# Patient Record
Sex: MALE | Race: BLACK OR AFRICAN AMERICAN | Employment: OTHER | URBAN - METROPOLITAN AREA
[De-identification: names, ages, dates, MRNs, and addresses within clinical notes are randomized per-mention and may not be internally consistent; named-entity substitution may affect disease eponyms.]

---

## 2017-06-06 ENCOUNTER — HOSPITAL ENCOUNTER (OUTPATIENT)
Dept: GENERAL RADIOLOGY | Age: 77
Discharge: HOME | End: 2017-06-06
Attending: INTERNAL MEDICINE
Payer: MEDICARE

## 2017-06-06 DIAGNOSIS — M25.511 SHOULDER PAIN, RIGHT: ICD-10-CM

## 2017-06-06 DIAGNOSIS — M25.512 SHOULDER PAIN, LEFT: ICD-10-CM

## 2017-06-06 PROCEDURE — 73030 X-RAY EXAM OF SHOULDER: CPT

## 2017-07-18 ENCOUNTER — OFFICE VISIT (OUTPATIENT)
Dept: FAMILY MEDICINE CLINIC | Age: 77
End: 2017-07-18

## 2017-07-18 VITALS
RESPIRATION RATE: 16 BRPM | HEIGHT: 69 IN | SYSTOLIC BLOOD PRESSURE: 140 MMHG | HEART RATE: 66 BPM | OXYGEN SATURATION: 99 % | WEIGHT: 187 LBS | TEMPERATURE: 95.6 F | DIASTOLIC BLOOD PRESSURE: 75 MMHG | BODY MASS INDEX: 27.7 KG/M2

## 2017-07-18 DIAGNOSIS — I10 ESSENTIAL HYPERTENSION: ICD-10-CM

## 2017-07-18 DIAGNOSIS — Z86.73 HISTORY OF CVA (CEREBROVASCULAR ACCIDENT): ICD-10-CM

## 2017-07-18 DIAGNOSIS — N18.5 CKD (CHRONIC KIDNEY DISEASE), STAGE 5: Primary | ICD-10-CM

## 2017-07-18 DIAGNOSIS — F32.A DEPRESSION, UNSPECIFIED DEPRESSION TYPE: ICD-10-CM

## 2017-07-18 PROBLEM — N18.9 CKD (CHRONIC KIDNEY DISEASE): Status: ACTIVE | Noted: 2017-07-18

## 2017-07-18 RX ORDER — TAMSULOSIN HYDROCHLORIDE 0.4 MG/1
0.4 CAPSULE ORAL DAILY
COMMUNITY
End: 2018-04-03

## 2017-07-18 RX ORDER — SERTRALINE HYDROCHLORIDE 100 MG/1
100 TABLET, FILM COATED ORAL DAILY
Qty: 30 TAB | Refills: 5 | Status: SHIPPED | OUTPATIENT
Start: 2017-07-18 | End: 2017-08-01

## 2017-07-18 RX ORDER — FUROSEMIDE 20 MG/1
20 TABLET ORAL 2 TIMES DAILY
COMMUNITY
End: 2017-08-01

## 2017-07-18 RX ORDER — AMLODIPINE BESYLATE 10 MG/1
TABLET ORAL DAILY
COMMUNITY
End: 2017-08-01

## 2017-07-18 RX ORDER — SERTRALINE HYDROCHLORIDE 50 MG/1
TABLET, FILM COATED ORAL DAILY
COMMUNITY
End: 2017-07-18 | Stop reason: SDUPTHER

## 2017-07-18 RX ORDER — SEVELAMER CARBONATE 800 MG/1
TABLET, FILM COATED ORAL 3 TIMES DAILY
COMMUNITY
End: 2017-08-01

## 2017-07-18 RX ORDER — ASPIRIN AND DIPYRIDAMOLE 25; 200 MG/1; MG/1
1 CAPSULE, EXTENDED RELEASE ORAL 2 TIMES DAILY
COMMUNITY
End: 2019-03-27 | Stop reason: SDUPTHER

## 2017-07-18 NOTE — MR AVS SNAPSHOT
Visit Information Date & Time Provider Department Dept. Phone Encounter #  
 7/18/2017  2:45 PM Ronda Lowry, [address] [facility] [phone] [id number] Follow-up Instructions Return in about 3 months (around 10/18/2017) for 30 minute slot. Upcoming Health Maintenance Date Due DTaP/Tdap/Td series (1 - Tdap) 6/15/1959 ZOSTER VACCINE AGE 60> 6/15/1998 GLAUCOMA SCREENING Q2Y 6/15/2003 Pneumococcal 65+ Low/Medium Risk (1 of 2 - PCV13) 6/15/2003 MEDICARE YEARLY EXAM 6/15/2003 INFLUENZA AGE 9 TO ADULT 8/1/2017 Allergies as of 7/18/2017  Review Complete On: 7/18/2017 By: Ronda Lowry MD  
 No Known Allergies Current Immunizations  Never Reviewed No immunizations on file. Not reviewed this visit You Were Diagnosed With   
  
 Codes Comments CKD (chronic kidney disease), stage 5 (HCC)    -  Primary ICD-10-CM: N18.5 ICD-9-CM: 585.5 Depression, unspecified depression type     ICD-10-CM: F32.9 ICD-9-CM: 043 Essential hypertension     ICD-10-CM: I10 
ICD-9-CM: 401.9 History of CVA (cerebrovascular accident)     ICD-10-CM: Z86.73 
ICD-9-CM: V12.54 confusion. occured in 2005 Vitals BP Pulse Temp Resp Height(growth percentile) Weight(growth percentile) 140/75 66 95.6 °F (35.3 °C) (Oral) 16 5' 8.5\" (1.74 m) 187 lb (84.8 kg) SpO2 BMI Smoking Status 99% 28.02 kg/m2 Never Smoker Vitals History BMI and BSA Data Body Mass Index Body Surface Area 28.02 kg/m 2 2.02 m 2 Preferred Pharmacy Pharmacy Name Phone [id number] [phone] Your Updated Medication List  
  
   
This list is accurate as of: 7/18/17  3:34 PM.  Always use your most recent med list. amLODIPine 10 mg tablet Commonly known as:  Sigurdsandra Saldana Take  by mouth daily. aspirin-dipyridamole  mg per SR capsule Commonly known as:  AGGRENOX Take 1 Cap by mouth two (2) times a day. FLOMAX 0.4 mg capsule Generic drug:  tamsulosin Take 0.4 mg by mouth daily. LASIX 20 mg tablet Generic drug:  furosemide Take 20 mg by mouth two (2) times a day. RENVELA 800 mg Tab tab Generic drug:  sevelamer carbonate Take  by mouth three (3) times daily. sertraline 100 mg tablet Commonly known as:  ZOLOFT Take 1 Tab by mouth daily. Prescriptions Sent to Pharmacy Refills  
 sertraline (ZOLOFT) 100 mg tablet 5 Sig: Take 1 Tab by mouth daily. Class: Normal  
 Pharmacy: [address] [place] [address], [address] [place] [address] #: [phone] Route: Oral  
  
We Performed the Following REFERRAL TO NEPHROLOGY [GSB24 Custom] Comments:  
 Please evaluate patient for CKD/HD- pt would like a second opinion (currently seeing Dr. Jonathon Muñoz) Follow-up Instructions Return in about 3 months (around 10/18/2017) for 30 minute slot. Referral Information Referral ID Referred By Referred To  
  
 [id number] TERRELL Ackerman Not Available Visits Status Start Date End Date 1 New Request 7/18/17 7/18/18 If your referral has a status of pending review or denied, additional information will be sent to support the outcome of this decision. Patient Instructions Sertraline (By mouth) Sertraline (SER-tra-lebron) Treats depression, obsessive-compulsive disorder (OCD), posttraumatic stress disorder (PTSD), premenstrual dysphoric disorder (PMDD), social anxiety disorder, and panic disorder. This medicine is an SSRI. Brand Name(s): Zoloft There may be other brand names for this medicine. When This Medicine Should Not Be Used: This medicine is not right for everyone. Do not use it if you had an allergic reaction to sertraline. How to Use This Medicine:  
Liquid, Tablet · Take your medicine as directed.  Your dose may need to be changed several times to find what works best for you. You may need to take it for a few weeks or months before you feel better. · Oral liquid: Use the dropper provided to remove the medicine and mix it with 1/2 cup (4 ounces) of water, ginger ale, lemon-lime soda, lemonade, or orange juice. Drink the mixture right away. It is normal for it to look a bit hazy. · This medicine should come with a Medication Guide. Ask your pharmacist for a copy if you do not have one. · Missed dose: Take a dose as soon as you remember. If it is almost time for your next dose, wait until then and take a regular dose. Do not take extra medicine to make up for a missed dose. · Store the medicine in a closed container at room temperature, away from heat, moisture, and direct light. Drugs and Foods to Avoid: Ask your doctor or pharmacist before using any other medicine, including over-the-counter medicines, vitamins, and herbal products. · Do not use this medicine together with pimozide. Do not use this medicine and an MAO inhibitor (MAOI) within 14 days of each other. Do not use the oral liquid form of sertraline if you are also using disulfiram. 
· Some medicines can affect how sertraline works. Tell your doctor if you are using the following:  
¨ Buspirone, cimetidine, cisapride, diazepam, digitoxin, fentanyl, flecainide, lithium, phenytoin, propafenone, Nancy's wort, tramadol, tryptophan supplements, or valproate ¨ A blood thinner (such as warfarin), a diuretic (water pill), an NSAID pain or arthritis medicine (such as aspirin, diclofenac, ibuprofen), a tricyclic antidepressant, a triptan medicine for migraine headaches · Do not drink alcohol while you are using this medicine. Warnings While Using This Medicine: · Tell your doctor if you are pregnant or breastfeeding, or if you have liver disease, bleeding problems, glaucoma, heart disease, or a seizure disorder.  
· For some children, teenagers, and young adults, this medicine may increase mental or emotional problems. This may lead to thoughts of suicide and violence. Talk with your doctor right away if you have any thoughts or behavior changes that concern you. Tell your doctor if you or anyone in your family has a history of bipolar disorder or suicide attempts. · This medicine may cause the following problems:  
¨ Serotonin syndrome (when taken with certain medicines) ¨ Low sodium levels (more common in elderly patients and those who take diuretics or become dehydrated) · Tell your doctor if you are sensitive to latex, because the oral liquid comes with a latex rubber dropper. · This medicine may make you dizzy or drowsy. Do not drive or do anything that could be dangerous until you know how this medicine affects you. · Do not stop using this medicine suddenly. Your doctor will need to slowly decrease your dose before you stop it completely. · Your doctor will check your progress and the effects of this medicine at regular visits. Keep all appointments. · Keep all medicine out of the reach of children. Never share your medicine with anyone. Possible Side Effects While Using This Medicine:  
Call your doctor right away if you notice any of these side effects: · Allergic reaction: Itching or hives, swelling in your face or hands, swelling or tingling in your mouth or throat, chest tightness, trouble breathing · Anxiety, restlessness, fast heartbeat, fever, sweating, muscle spasms, twitching, nausea, vomiting, diarrhea, seeing or hearing things that are not there · Blistering, peeling, or red skin rash · Confusion, weakness, and muscle twitching · Eye pain, vision changes, seeing halos around lights · Feeling more excited or energetic than usual 
· Thoughts of hurting yourself or others, unusual behavior · Unusual bleeding or bruising If you notice these less serious side effects, talk with your doctor: · Dry mouth · Loss of appetite, weight loss · Mild diarrhea, constipation, nausea, vomiting · Sexual problems · Sleepiness, or trouble sleeping If you notice other side effects that you think are caused by this medicine, tell your doctor. Call your doctor for medical advice about side effects. You may report side effects to FDA at 0-534-AGH-4506 © 2017 [address] Information is for End User's use only and may not be sold, redistributed or otherwise used for commercial purposes. The above information is an  only. It is not intended as medical advice for individual conditions or treatments. Talk to your doctor, nurse or pharmacist before following any medical regimen to see if it is safe and effective for you. Introducing [facility]! [facility] introduces [employer] Compare patient portal. Now you can access parts of your medical record, email your doctor's office, and request medication refills online. 1. In your internet browser, go to [email or web address] 2. Click on the First Time User? Click Here link in the Sign In box. You will see the New Member Sign Up page. 3. Enter your [employer] Compare Access Code exactly as it appears below. You will not need to use this code after youve completed the sign-up process. If you do not sign up before the expiration date, you must request a new code. · [employer] Compare Access Code: [email or web address] Expires: 10/16/2017  3:33 PM 
 
4. Enter the last four digits of your Social Security Number (xxxx) and Date of Birth (mm/dd/yyyy) as indicated and click Submit. You will be taken to the next sign-up page. 5. Create a WizIQt ID. This will be your Cruise Compare login ID and cannot be changed, so think of one that is secure and easy to remember. 6. Create a [employer] Compare password. You can change your password at any time. 7. Enter your Password Reset Question and Answer. This can be used at a later time if you forget your password. 8. Enter your e-mail address. You will receive e-mail notification when new information is available in [address] 9. Click Sign Up. You can now view and download portions of your medical record. 10. Click the Download Summary menu link to download a portable copy of your medical information. If you have questions, please visit the Frequently Asked Questions section of the [employer] website. Remember, [employer] is NOT to be used for urgent needs. For medical emergencies, dial 911. Now available from your iPhone and Android! Please provide this summary of care documentation to your next provider. Your primary care clinician is listed as MORGAN [place]. If you have any questions after today's visit, please call [phone].

## 2017-07-18 NOTE — PROGRESS NOTES
Sonya Robbins is a 76 y.o.  male and presents with Establish Care       Subjective:    CKD- sees Dr. Ede Mcgovern- on HD. Pt is unsure why he needs 4 hrs and 15 minutes of HD- he was told it was required by insurance- he would like a second opinion. htn- takes norvasc  H/o CVA in 2005- on aggrenox. No further sx. Depression- on zoloft- control is so-so pt reports, gets anxious on dialysis. No SI/HI. H/o prostate cancer- s/p resection- sees urology yearly. Assessment/Plan:    CKD- will refer pt to renal for a second opinion at his request but I explained that Dr. Ede Mcgovern has always been a very trustworthy physician in my interactions with him. HTN- bp adequately controlled today. No changes. H/o CVA- stable. Continue aggrenox. Depression- moderate control- will increase to 100mg - no dosage change for CKD. H/o prostate cancer- continue to f/u with urology- unsure of Dr name. rtc in 3 months- will try to get some old records. ROS:  Constitutional: No recent weight change. No weakness/fatigue. No f/c. Skin: No rashes, change in nails/hair, itching   HENT: No HA, dizziness. No hearing loss/tinnitus. No nasal congestion/discharge. Eyes: No change in vision, double/blurred vision or eye pain/redness. Cardiovascular: No CP/palpitations. No HARRIS/orthopnea/PND. Respiratory: No cough/sputum, dyspnea, wheezing. Gastointestinal: No dysphagia, reflux. No n/v. No constipation/diarrhea. No melena/rectal bleeding. Genitourinary: No dysuria, urinary hesitancy, nocturia, hematuria. No incontinence. Musculoskeletal: No joint pain/stiffness. No muscle pain/tenderness. Endo: No heat/cold intolerance, no polyuria/polydypsia. Heme: No h/o anemia. No easy bleeding/bruising. Allergy/Immunology: No seasonal rhinitis. Denies frequent colds, sinus/ear infections. Neurological: No seizures/numbness/weakness. No paresthesias. Psychiatric:  No depression, anxiety.      PMH:  Past Medical History: Diagnosis Date    Cancer [facility])     Chronic kidney disease     Depression     [facility]ialysis patient ([facility] [address] [facility]     Hypertension     Prostate cancer ([facility] [address]        There is no problem list on file for this patient. PSH:  Past Surgical History:   Procedure Laterality Date    HX COLONOSCOPY  2015    HX HEENT      Cataract surgery 2002        SH:  Social History   Substance Use Topics    Smoking status: Never Smoker    Smokeless tobacco: Never Used    Alcohol use 4.8 oz/week     3 Cans of beer, 5 Shots of liquor per week       FH:  History reviewed. No pertinent family history. Medications/Allergies:    Current Outpatient Prescriptions:     sevelamer carbonate (RENVELA) 800 mg tab tab, Take  by mouth three (3) times daily. , Disp: , Rfl:     tamsulosin (FLOMAX) 0.4 mg capsule, Take 0.4 mg by mouth daily. , Disp: , Rfl:     sertraline (ZOLOFT) 50 mg tablet, Take  by mouth daily. , Disp: , Rfl:     amLODIPine (NORVASC) 10 mg tablet, Take  by mouth daily. , Disp: , Rfl:     aspirin-dipyridamole (AGGRENOX)  mg per SR capsule, Take 1 Cap by mouth two (2) times a day., Disp: , Rfl:     furosemide (LASIX) 20 mg tablet, Take 20 mg by mouth two (2) times a day., Disp: , Rfl:     No Known Allergies    Objective:  Visit Vitals    /75    Pulse 66    Temp 95.6 °F (35.3 °C) (Oral)    Resp 16    Ht 5' 8.5\" (1.74 m)    Wt 187 lb (84.8 kg)    SpO2 99%    BMI 28.02 kg/m2    Body mass index is 28.02 kg/(m^2). Constitutional: Well developed, nourished, no distress, alert   HENT: Exterior ears and tympanic membranes normal bilaterally. Supple neck. No thyromegaly or lymphadenopathy. Oropharynx clear and moist mucous membranes. Eyes: Conjunctiva normal. PERRL. Cardiovascular: S1, S2.  RRR. No murmurs/rubs. No thrills palpated. No carotid bruits. Intact distal pulses. No edema. Pulmonary/Chest Wall: No abnormalities on inspection. Clear to auscultation bilaterally. No wheezing/rhonchi.   Normal effort. GI: Soft, nontender, nondistended. Normal active bowel sounds. No  masses on palpation. No hepatosplenomegaly. Musculoskeletal: Gait normal.  Joints without deformity/tenderness. Strength intact bilateral upper and lower ext. Normal ROM all extremities. Neurological: Appropriate. 2+DTR. No focal motor or sensory deficits. Speech normal.   Skin: No lesions/rashes on inspection. Psych: Appropriate affect, judgement and insight. Short-term memory intact. Health Maintenance:   Health Maintenance   Topic Date Due    DTaP/Tdap/Td series (1 - Tdap) 06/15/1959    ZOSTER VACCINE AGE 60>  06/15/1998    GLAUCOMA SCREENING Q2Y  06/15/2003    Pneumococcal 65+ Low/Medium Risk (1 of 2 - PCV13) 06/15/2003    MEDICARE YEARLY EXAM  06/15/2003    INFLUENZA AGE 9 TO ADULT  08/01/2017       Orders Placed This Encounter    REFERRAL TO NEPHROLOGY     Referral Priority:   Routine     Referral Type:   Consultation     Referral Reason:   Specialty Services Required    sevelamer carbonate (RENVELA) 800 mg tab tab     Sig: Take  by mouth three (3) times daily.  tamsulosin (FLOMAX) 0.4 mg capsule     Sig: Take 0.4 mg by mouth daily.  DISCONTD: sertraline (ZOLOFT) 50 mg tablet     Sig: Take  by mouth daily.  amLODIPine (NORVASC) 10 mg tablet     Sig: Take  by mouth daily.  aspirin-dipyridamole (AGGRENOX)  mg per SR capsule     Sig: Take 1 Cap by mouth two (2) times a day.  furosemide (LASIX) 20 mg tablet     Sig: Take 20 mg by mouth two (2) times a day.  sertraline (ZOLOFT) 100 mg tablet     Sig: Take 1 Tab by mouth daily.      Dispense:  30 Tab     Refill:  5

## 2017-07-18 NOTE — PATIENT INSTRUCTIONS
Sertraline (By mouth)   Sertraline (SER-tra-lebron)  Treats depression, obsessive-compulsive disorder (OCD), posttraumatic stress disorder (PTSD), premenstrual dysphoric disorder (PMDD), social anxiety disorder, and panic disorder. This medicine is an SSRI. Brand Name(s): Zoloft   There may be other brand names for this medicine. When This Medicine Should Not Be Used: This medicine is not right for everyone. Do not use it if you had an allergic reaction to sertraline. How to Use This Medicine:   Liquid, Tablet  · Take your medicine as directed. Your dose may need to be changed several times to find what works best for you. You may need to take it for a few weeks or months before you feel better. · Oral liquid: Use the dropper provided to remove the medicine and mix it with 1/2 cup (4 ounces) of water, ginger ale, lemon-lime soda, lemonade, or orange juice. Drink the mixture right away. It is normal for it to look a bit hazy. · This medicine should come with a Medication Guide. Ask your pharmacist for a copy if you do not have one. · Missed dose: Take a dose as soon as you remember. If it is almost time for your next dose, wait until then and take a regular dose. Do not take extra medicine to make up for a missed dose. · Store the medicine in a closed container at room temperature, away from heat, moisture, and direct light. Drugs and Foods to Avoid:   Ask your doctor or pharmacist before using any other medicine, including over-the-counter medicines, vitamins, and herbal products. · Do not use this medicine together with pimozide. Do not use this medicine and an MAO inhibitor (MAOI) within 14 days of each other. Do not use the oral liquid form of sertraline if you are also using disulfiram.  · Some medicines can affect how sertraline works.  Tell your doctor if you are using the following:   ¨ Buspirone, cimetidine, cisapride, diazepam, digitoxin, fentanyl, flecainide, lithium, phenytoin, propafenone, St Arun's wort, tramadol, tryptophan supplements, or valproate  ¨ A blood thinner (such as warfarin), a diuretic (water pill), an NSAID pain or arthritis medicine (such as aspirin, diclofenac, ibuprofen), a tricyclic antidepressant, a triptan medicine for migraine headaches  · Do not drink alcohol while you are using this medicine. Warnings While Using This Medicine:   · Tell your doctor if you are pregnant or breastfeeding, or if you have liver disease, bleeding problems, glaucoma, heart disease, or a seizure disorder. · For some children, teenagers, and young adults, this medicine may increase mental or emotional problems. This may lead to thoughts of suicide and violence. Talk with your doctor right away if you have any thoughts or behavior changes that concern you. Tell your doctor if you or anyone in your family has a history of bipolar disorder or suicide attempts. · This medicine may cause the following problems:   ¨ Serotonin syndrome (when taken with certain medicines)  ¨ Low sodium levels (more common in elderly patients and those who take diuretics or become dehydrated)  · Tell your doctor if you are sensitive to latex, because the oral liquid comes with a latex rubber dropper. · This medicine may make you dizzy or drowsy. Do not drive or do anything that could be dangerous until you know how this medicine affects you. · Do not stop using this medicine suddenly. Your doctor will need to slowly decrease your dose before you stop it completely. · Your doctor will check your progress and the effects of this medicine at regular visits. Keep all appointments. · Keep all medicine out of the reach of children. Never share your medicine with anyone.   Possible Side Effects While Using This Medicine:   Call your doctor right away if you notice any of these side effects:  · Allergic reaction: Itching or hives, swelling in your face or hands, swelling or tingling in your mouth or throat, chest tightness, trouble breathing  · Anxiety, restlessness, fast heartbeat, fever, sweating, muscle spasms, twitching, nausea, vomiting, diarrhea, seeing or hearing things that are not there  · Blistering, peeling, or red skin rash  · Confusion, weakness, and muscle twitching  · Eye pain, vision changes, seeing halos around lights  · Feeling more excited or energetic than usual  · Thoughts of hurting yourself or others, unusual behavior  · Unusual bleeding or bruising  If you notice these less serious side effects, talk with your doctor:   · Dry mouth  · Loss of appetite, weight loss  · Mild diarrhea, constipation, nausea, vomiting  · Sexual problems  · Sleepiness, or trouble sleeping  If you notice other side effects that you think are caused by this medicine, tell your doctor. Call your doctor for medical advice about side effects. You may report side effects to FDA at 9-769-FDA-3981  © 2017 [address] Information is for End User's use only and may not be sold, redistributed or otherwise used for commercial purposes. The above information is an  only. It is not intended as medical advice for individual conditions or treatments. Talk to your doctor, nurse or pharmacist before following any medical regimen to see if it is safe and effective for you.

## 2017-07-18 NOTE — LETTER
07/19/17 Svitlana Houston [address] [facility] Danae Dear Svitlana Houston, Welcome to [facility] and thank you for choosing me as your primary care physician. My staff is a skilled and caring team who is committed to providing patients with the best care possible within their new medical home. As a local physician I am excited about welcoming new patients to my practice as well as developing long lasting relationships with established patients and their friends and family members. I look forward to a continued relationship of care, compassion and trust with you. If you have family members or friends who may be searching for a new doctor I would be happy to see them. They may contact my staff and schedule an appointment convenient with their schedule by calling [phone]. My schedule is flexible and offers both early morning appointments beginning at 8:00am as well as same day visits. We understand the busy schedules of life and will work together to assure that their experience is pleasant and meets their medical needs. Thank you for giving me the opportunity to care for you. I look forward to seeing you again and welcome your friends and family as part of our medical home.    
 
 
Sincerely, 
 
 
Cassie Brush MD

## 2017-08-01 ENCOUNTER — HOSPITAL ENCOUNTER (EMERGENCY)
Age: 77
Discharge: HOME | End: 2017-08-01
Attending: EMERGENCY MEDICINE
Payer: MEDICARE

## 2017-08-01 VITALS
TEMPERATURE: 97.6 F | BODY MASS INDEX: 28.04 KG/M2 | RESPIRATION RATE: 14 BRPM | WEIGHT: 185 LBS | DIASTOLIC BLOOD PRESSURE: 85 MMHG | HEIGHT: 68 IN | SYSTOLIC BLOOD PRESSURE: 176 MMHG | HEART RATE: 66 BPM | OXYGEN SATURATION: 100 %

## 2017-08-01 DIAGNOSIS — N18.9 CHRONIC KIDNEY DISEASE, UNSPECIFIED STAGE: Primary | ICD-10-CM

## 2017-08-01 LAB
ANION GAP BLD CALC-SCNC: 8 MMOL/L (ref 3–18)
APPEARANCE UR: CLEAR
BACTERIA URNS QL MICRO: ABNORMAL /HPF
BILIRUB UR QL: NEGATIVE
BUN SERPL-MCNC: 37 MG/DL (ref 7–18)
BUN/CREAT SERPL: 6 (ref 12–20)
CALCIUM SERPL-MCNC: 9.1 MG/DL (ref 8.5–10.1)
CHLORIDE SERPL-SCNC: 104 MMOL/L (ref 100–108)
CO2 SERPL-SCNC: 28 MMOL/L (ref 21–32)
COLOR UR: YELLOW
CREAT SERPL-MCNC: 5.75 MG/DL (ref 0.6–1.3)
EPITH CASTS URNS QL MICRO: ABNORMAL /LPF (ref 0–5)
GLUCOSE SERPL-MCNC: 101 MG/DL (ref 74–99)
GLUCOSE UR STRIP.AUTO-MCNC: NEGATIVE MG/DL
HGB UR QL STRIP: ABNORMAL
HYALINE CASTS URNS QL MICRO: ABNORMAL /LPF (ref 0–2)
KETONES UR QL STRIP.AUTO: NEGATIVE MG/DL
LEUKOCYTE ESTERASE UR QL STRIP.AUTO: NEGATIVE
MUCOUS THREADS URNS QL MICRO: ABNORMAL /LPF
NITRITE UR QL STRIP.AUTO: NEGATIVE
PH UR STRIP: 7.5 [PH] (ref 5–8)
POTASSIUM SERPL-SCNC: 4.9 MMOL/L (ref 3.5–5.5)
PROT UR STRIP-MCNC: 300 MG/DL
RBC #/AREA URNS HPF: ABNORMAL /HPF (ref 0–5)
SODIUM SERPL-SCNC: 140 MMOL/L (ref 136–145)
SP GR UR REFRACTOMETRY: 1.02 (ref 1–1.03)
UROBILINOGEN UR QL STRIP.AUTO: 1 EU/DL (ref 0.2–1)
WBC URNS QL MICRO: ABNORMAL /HPF (ref 0–4)

## 2017-08-01 PROCEDURE — 99283 EMERGENCY DEPT VISIT LOW MDM: CPT

## 2017-08-01 PROCEDURE — 81001 URINALYSIS AUTO W/SCOPE: CPT | Performed by: PHYSICIAN ASSISTANT

## 2017-08-01 PROCEDURE — 80048 BASIC METABOLIC PNL TOTAL CA: CPT | Performed by: PHYSICIAN ASSISTANT

## 2017-08-01 NOTE — DISCHARGE INSTRUCTIONS
Chronic Kidney Disease: Care Instructions  Your Care Instructions  Chronic kidney disease happens when your kidneys don't work as well as they should. Your kidneys have a few important jobs. They remove waste from your blood. This waste leaves your body in your urine. They also balance your body's fluids and chemicals. When your kidneys don't work well, extra waste and fluid can build up. This can poison the body and sometimes cause death. The most common causes of this disease are diabetes and high blood pressure. In some cases, the disease develops in 2 to 3 months. But it usually develops over many years. If you take medicine and make healthy changes to your lifestyle, you may be able to prevent the disease from getting worse. But if your kidney damage gets worse, you may need dialysis or a kidney transplant. Dialysis uses a machine to filter waste from the blood. A transplant is surgery to give you a healthy kidney from another person. Follow-up care is a key part of your treatment and safety. Be sure to make and go to all appointments, and call your doctor if you are having problems. It's also a good idea to know your test results and keep a list of the medicines you take. How can you care for yourself at home? Treatments and appointments  · Be safe with medicines. Take your medicines exactly as prescribed. Call your doctor if you have any problems with your medicine. You also may take medicine to control your blood pressure or to treat diabetes. Many people who have diabetes take blood pressure medicine. · If you have diabetes, do your best to keep your blood sugar in your target range. You may do this by eating healthy food and exercising. You may also take medicines. · Go to your dialysis appointments if you have this treatment. · Do not take ibuprofen (Advil, Motrin), naproxen (Aleve), or similar medicines, unless your doctor tells you to. These may make the disease worse.   · Do not take any vitamins, over-the-counter medicines, or herbal products without talking to your doctor first.  · Do not smoke or use other tobacco products. Smoking can reduce blood flow to the kidneys. If you need help quitting, talk to your doctor about stop-smoking programs and medicines. These can increase your chances of quitting for good. · Do not drink alcohol or use illegal drugs. · Talk to your doctor about an exercise plan. Exercise helps lower your blood pressure. It also makes you feel better. · If you have an advance directive, let your doctor know. It may include a living will and a durable power of  for health care. If you don't have one, you may want to prepare one. It lets your doctor and loved ones know your health care wishes if you become unable to speak for yourself. Diet  · Talk to a registered dietitian. He or she can help you make a meal plan that is right for you. Most people with kidney disease need to limit salt (sodium), fluids, and protein. Some also have to limit potassium and phosphorus. · You may have to give up many foods you like. But try to focus on the fact that this will help you stay healthy for as long as possible. · If you have a hard time eating enough, talk to your doctor or dietitian about ways to add calories to your diet. · Your diet may change as your disease changes. See your doctor for regular testing. And work with a dietitian to change your diet as needed. When should you call for help? Call 911 anytime you think you may need emergency care. For example, call if:  · You passed out (lost consciousness). Call your doctor now or seek immediate medical care if:  · You have less urine than normal or no urine. · You have trouble urinating or can urinate only very small amounts. · You are confused or have trouble thinking clearly. · You feel weaker or more tired than usual.  · You are very thirsty, lightheaded, or dizzy. · You have nausea and vomiting.   · You have new swelling of your arms or feet, or your swelling is worse. · You have blood in your urine. · You have new or worse trouble breathing. Watch closely for changes in your health, and be sure to contact your doctor if:  · You have any problems with your medicine or other treatment. Where can you learn more? Go to http://lizy-maylin.info/. Enter N276 in the search box to learn more about \"Chronic Kidney Disease: Care Instructions. \"  Current as of: April 3, 2017  Content Version: 11.3  © 2599-8385 [employer]. Care instructions adapted under license by [employer] (which disclaims liability or warranty for this information). If you have questions about a medical condition or this instruction, always ask your healthcare professional. [address] any warranty or liability for your use of this information.

## 2017-08-01 NOTE — ED TRIAGE NOTES
Patient is a dialysis patient, wants his kidneys checked, nephologist is Rust,    Patient states he is incontinent of urine and is concerned that he urinates all of the time

## 2017-08-01 NOTE — ED PROVIDER NOTES
[facility] EMERGENCY DEPT      76 y.o. male with a PMH of prior Prostate CA and Stage V kidney disease presents to the ED asking to have his kidneys checked. States that he sees a nephrologist and has been on dialysis for the past 2 years. Says his is wondering if he still needs to be on dialysis because he is still making urine. Also is concerned that he is taking a \"water pill\" while being on dialysis. Denies any symptoms at this time. No current facility-administered medications for this encounter. Current Outpatient Prescriptions   Medication Sig    tamsulosin (FLOMAX) 0.4 mg capsule Take 0.4 mg by mouth daily.  aspirin-dipyridamole (AGGRENOX)  mg per SR capsule Take 1 Cap by mouth two (2) times a day.  sertraline (ZOLOFT) 50 mg tablet Take 50 mg by mouth daily.  sevelamer carbonate (RENVELA) 800 mg tab tab Take 800 mg by mouth three (3) times daily.  furosemide (LASIX) 20 mg tablet Take 20 mg by mouth daily.  amLODIPine (NORVASC) 10 mg tablet Take 10 mg by mouth daily. Indications: HYPERTENSION    atorvastatin (LIPITOR) 20 mg tablet Take 10 mg by mouth daily.        Past Medical History:   Diagnosis Date    Arthritis     Cancer ([facility]     Chronic kidney disease     dialysis    Chronic kidney disease     Community acquired pneumonia     Decreased exercise tolerance     Depression     Dialysis patient ([address]     Difficulty urinating     Dizziness     Hypercholesteremia     Hypercholesterolemia     Hypertension     Other malignant neoplasm without specification of site     Prostate CA ([facility] [address] [facility] 2007    s/p combined radiation therapy    Prostate cancer ([facility]     Renal failure     Stroke [facility])        Past Surgical History:   Procedure Laterality Date    HX COLONOSCOPY  2015    HX HEENT  cataract sx    HX HEENT      Cataract surgery 2002    HX OTHER SURGICAL  09/10/2014    DIALYSIS FISTULA OR GRAFT    HX OTHER SURGICAL  07/20/2015    VENOUS ACCESS CATHETER INSERTION    HX OTHER SURGICAL  11/03/2016    VENOUS ACCESS CATHETER INSERTION    HX OTHER SURGICAL  11/04/2016    ARTERIOVENOUS FISTULA REVISION    HX UROLOGICAL  2007    INSERTION RADIOACTIVE SEEDS       History reviewed. No pertinent family history. Social History     Social History    Marital status:      Spouse name: N/A    Number of children: N/A    Years of education: N/A     Occupational History    Not on file. Social History Main Topics    Smoking status: Never Smoker    Smokeless tobacco: Never Used    Alcohol use 4.8 oz/week     1 Cans of beer, 5 Shots of liquor per week      Comment: occasional    Drug use: No    Sexual activity: No      Comment: E.D. SP CAP/XRT     Other Topics Concern    Not on file     Social History Narrative    ** Merged History Encounter **            No Known Allergies    Patient's primary care provider (as noted in EPIC):  Yin Guthrie MD    Constitutional:  Denies malaise, fever, chills. GI/ABD:  Denies injury, pain, distention, nausea, vomiting, diarrhea. :  Denies injury, pain, dysuria or urgency. Back:  Denies injury or pain. Extremity/MS:  Denies injury or pain. Neuro:  Denies headache, LOC, dizziness, neurologic symptoms/deficits/paresthesias. Skin: Denies injury, rash, itching or skin changes. All other systems negative as reviewed. Visit Vitals    /85 (BP 1 Location: Right arm, BP Patient Position: At rest;Sitting)    Pulse 66    Temp 97.6 °F (36.4 °C)    Resp 14    Ht 5' 8\" (1.727 m)    Wt 83.9 kg (185 lb)    SpO2 100%    BMI 28.13 kg/m2       PHYSICAL EXAM:    CONSTITUTIONAL:  Alert, in no apparent distress;  well developed;  well nourished. HEAD:  Normocephalic, atraumatic. EYES:  EOMI. Non-icteric sclera. Normal conjunctiva. ENTM:  Mouth: mucous membranes moist.  NECK:  Supple  RESPIRATORY:  Chest clear, equal breath sounds, good air movement. Without wheezes, rhonchi or rales.     CARDIOVASCULAR: Regular rate and rhythm. No murmurs, rubs, or gallops. UPPER EXT:  Normal inspection. NEURO:  Moves all four extremities, and grossly normal motor exam.  SKIN:  No rashes;  Normal for age. PSYCH:  Alert and normal affect. ED COURSE:      Recent Results (from the past 12 hour(s))   URINALYSIS W/ RFLX MICROSCOPIC    Collection Time: 08/01/17 10:13 AM   Result Value Ref Range    Color YELLOW      Appearance CLEAR      Specific gravity 1.017 1.005 - 1.030      pH (UA) 7.5 5.0 - 8.0      Protein 300 (A) NEG mg/dL    Glucose NEGATIVE  NEG mg/dL    Ketone NEGATIVE  NEG mg/dL    Bilirubin NEGATIVE  NEG      Blood TRACE (A) NEG      Urobilinogen 1.0 0.2 - 1.0 EU/dL    Nitrites NEGATIVE  NEG      Leukocyte Esterase NEGATIVE  NEG     URINE MICROSCOPIC ONLY    Collection Time: 08/01/17 10:13 AM   Result Value Ref Range    WBC 0 to 3 0 - 4 /hpf    RBC 0 to 3 0 - 5 /hpf    Epithelial cells 1+ 0 - 5 /lpf    Bacteria 1+ (A) NEG /hpf    Mucus 1+ (A) NEG /lpf    Hyaline cast 0 to 3 0 - 2 /lpf   METABOLIC PANEL, BASIC    Collection Time: 08/01/17 10:20 AM   Result Value Ref Range    Sodium 140 136 - 145 mmol/L    Potassium 4.9 3.5 - 5.5 mmol/L    Chloride 104 100 - 108 mmol/L    CO2 28 21 - 32 mmol/L    Anion gap 8 3.0 - 18 mmol/L    Glucose 101 (H) 74 - 99 mg/dL    BUN 37 (H) 7.0 - 18 MG/DL    Creatinine 5.75 (H) 0.6 - 1.3 MG/DL    BUN/Creatinine ratio 6 (L) 12 - 20      GFR est AA 12 (L) >60 ml/min/1.73m2    GFR est non-AA 10 (L) >60 ml/min/1.73m2    Calcium 9.1 8.5 - 10.1 MG/DL       IMPRESSION AND MEDICAL DECISION MAKING:  Pt presents questioning whether he needs to be on dialysis, asking for a second opinion and questioning as to if he should be on Lasix (his fluid pill) while on dialysis. GFR is 12. Pt given a second nephrologist and informed that he may make an appointment to get another opinion. Pt looks well. Goes again for dialysis tomorrow.   Instructed to continue everything his prior nephrologist and PCP had him taking. Diagnosis:   1. ESRD (end stage renal disease) ([facility] [address]      Disposition: Discharge    Follow-up Information     Follow up With Details Comments Contact Info    Parviz Kruger M.D., [facility]. In 3 days  [address], [address] EMERGENCY DEPT  If symptoms worsen [address] [facility] [address]          Patient's Medications   Start Taking    No medications on file   Continue Taking    AMLODIPINE (NORVASC) 10 MG TABLET    Take 10 mg by mouth daily. Indications: HYPERTENSION    ASPIRIN-DIPYRIDAMOLE (AGGRENOX)  MG PER SR CAPSULE    Take 1 Cap by mouth two (2) times a day. ATORVASTATIN (LIPITOR) 20 MG TABLET    Take 10 mg by mouth daily. FUROSEMIDE (LASIX) 20 MG TABLET    Take 20 mg by mouth daily. SERTRALINE (ZOLOFT) 50 MG TABLET    Take 50 mg by mouth daily. SEVELAMER CARBONATE (RENVELA) 800 MG TAB TAB    Take 800 mg by mouth three (3) times daily. TAMSULOSIN (FLOMAX) 0.4 MG CAPSULE    Take 0.4 mg by mouth daily. These Medications have changed    No medications on file   Stop Taking    AMLODIPINE (NORVASC) 10 MG TABLET    Take  by mouth daily. DIPYRIDAMOLE-ASPIRIN (AGGRENOX) 200-25 MG PER SR CAPSULE    Take 1 Cap by mouth two (2) times a day. FUROSEMIDE (LASIX) 20 MG TABLET    Take 20 mg by mouth two (2) times a day. SERTRALINE (ZOLOFT) 100 MG TABLET    Take 1 Tab by mouth daily. SEVELAMER CARBONATE (RENVELA) 800 MG TAB TAB    Take  by mouth three (3) times daily. TAMSULOSIN (FLOMAX) 0.4 MG CAPSULE    Take 1 Cap by mouth daily.      SAMEER Holliday

## 2017-08-02 ENCOUNTER — PATIENT OUTREACH (OUTPATIENT)
Dept: FAMILY MEDICINE CLINIC | Age: 77
End: 2017-08-02

## 2017-08-02 NOTE — PROGRESS NOTES
Transitional Care: ED Visit        Patient listed on discharge (Daily Census) report on 8/2/2017. Patient discharged from [address] [place] [address] Emergency Department on  8/1/17 , diagnosed with  Chronic Kidney Disease. HPI: Reference by Matt ESTRELLA 8/1/17 1113 :76 y.o. male with a PMH of prior Prostate CA and Stage V kidney disease presents to the ED asking to have his kidneys checked. States that he sees a nephrologist and has been on dialysis for the past 2 years. Says his is wondering if he still needs to be on dialysis because he is still making urine. Also is concerned that he is taking a \"water pill\" while being on dialysis. Denies any symptoms at this time. Called patient on 8/2/2017,left voice mail for patient to return my call. Marli Barbour

## 2017-08-02 NOTE — PROGRESS NOTES
Transitional Care Follow up           Patient returned my call. Rested after Dialysis today and is feeling good. Mrs. Agustina Correa called  office to schedule appointment for second opinion, someone called them back and referred him to the ED for his second opinion. Patient's questions were answered, he still would like to know why he has to stay on Dialisys for 4hrs 15min. Explained purpose of Dalysis and goal to remove excess waist and fluid. Time is determined by the amount of waste and excess fluid. Patient seam satisfied and did not have further questions. Will fu in a few weeks.

## 2018-09-11 ENCOUNTER — HOSPITAL ENCOUNTER (OUTPATIENT)
Dept: LAB | Age: 78
Discharge: HOME | End: 2018-09-11
Payer: MEDICARE

## 2018-09-11 ENCOUNTER — OFFICE VISIT (OUTPATIENT)
Dept: FAMILY MEDICINE CLINIC | Age: 78
End: 2018-09-11

## 2018-09-11 VITALS
HEART RATE: 81 BPM | BODY MASS INDEX: 27.58 KG/M2 | HEIGHT: 68 IN | TEMPERATURE: 96.6 F | DIASTOLIC BLOOD PRESSURE: 60 MMHG | WEIGHT: 182 LBS | RESPIRATION RATE: 16 BRPM | SYSTOLIC BLOOD PRESSURE: 126 MMHG | OXYGEN SATURATION: 98 %

## 2018-09-11 DIAGNOSIS — E78.00 HYPERCHOLESTEREMIA: ICD-10-CM

## 2018-09-11 DIAGNOSIS — F33.41 RECURRENT MAJOR DEPRESSIVE DISORDER, IN PARTIAL REMISSION (HCC): ICD-10-CM

## 2018-09-11 DIAGNOSIS — I10 ESSENTIAL HYPERTENSION: ICD-10-CM

## 2018-09-11 DIAGNOSIS — C61 PROSTATE CA (HCC): ICD-10-CM

## 2018-09-11 DIAGNOSIS — Z99.2 CKD (CHRONIC KIDNEY DISEASE) REQUIRING CHRONIC DIALYSIS (HCC): Primary | ICD-10-CM

## 2018-09-11 DIAGNOSIS — N18.6 CKD (CHRONIC KIDNEY DISEASE) REQUIRING CHRONIC DIALYSIS (HCC): Primary | ICD-10-CM

## 2018-09-11 DIAGNOSIS — N18.6 CKD (CHRONIC KIDNEY DISEASE) REQUIRING CHRONIC DIALYSIS (HCC): ICD-10-CM

## 2018-09-11 DIAGNOSIS — Z99.2 CKD (CHRONIC KIDNEY DISEASE) REQUIRING CHRONIC DIALYSIS (HCC): ICD-10-CM

## 2018-09-11 DIAGNOSIS — I63.9 CEREBROVASCULAR ACCIDENT (CVA), UNSPECIFIED MECHANISM (HCC): ICD-10-CM

## 2018-09-11 LAB
ALBUMIN SERPL-MCNC: 3.8 G/DL (ref 3.4–5)
ALBUMIN/GLOB SERPL: 0.9 {RATIO} (ref 0.8–1.7)
ALP SERPL-CCNC: 92 U/L (ref 45–117)
ALT SERPL-CCNC: 22 U/L (ref 16–61)
ANION GAP SERPL CALC-SCNC: 12 MMOL/L (ref 3–18)
AST SERPL-CCNC: 13 U/L (ref 15–37)
BILIRUB SERPL-MCNC: 0.2 MG/DL (ref 0.2–1)
BUN SERPL-MCNC: 49 MG/DL (ref 7–18)
BUN/CREAT SERPL: 5 (ref 12–20)
CALCIUM SERPL-MCNC: 8.8 MG/DL (ref 8.5–10.1)
CHLORIDE SERPL-SCNC: 96 MMOL/L (ref 100–108)
CHOLEST SERPL-MCNC: 195 MG/DL
CO2 SERPL-SCNC: 28 MMOL/L (ref 21–32)
CREAT SERPL-MCNC: 9.86 MG/DL (ref 0.6–1.3)
ERYTHROCYTE [DISTWIDTH] IN BLOOD BY AUTOMATED COUNT: 15.2 % (ref 11.6–14.5)
GLOBULIN SER CALC-MCNC: 4.3 G/DL (ref 2–4)
GLUCOSE SERPL-MCNC: 90 MG/DL (ref 74–99)
HCT VFR BLD AUTO: 36.5 % (ref 36–48)
HDLC SERPL-MCNC: 131 MG/DL (ref 40–60)
HDLC SERPL: 1.5 {RATIO} (ref 0–5)
HGB BLD-MCNC: 12.2 G/DL (ref 13–16)
LDLC SERPL CALC-MCNC: 51.6 MG/DL (ref 0–100)
LIPID PROFILE,FLP: ABNORMAL
MCH RBC QN AUTO: 35.8 PG (ref 24–34)
MCHC RBC AUTO-ENTMCNC: 33.4 G/DL (ref 31–37)
MCV RBC AUTO: 107 FL (ref 74–97)
PLATELET # BLD AUTO: 174 K/UL (ref 135–420)
PMV BLD AUTO: 11.2 FL (ref 9.2–11.8)
POTASSIUM SERPL-SCNC: 5.2 MMOL/L (ref 3.5–5.5)
PROT SERPL-MCNC: 8.1 G/DL (ref 6.4–8.2)
RBC # BLD AUTO: 3.41 M/UL (ref 4.7–5.5)
SODIUM SERPL-SCNC: 136 MMOL/L (ref 136–145)
TRIGL SERPL-MCNC: 62 MG/DL (ref ?–150)
TSH SERPL DL<=0.05 MIU/L-ACNC: 0.95 UIU/ML (ref 0.36–3.74)
VLDLC SERPL CALC-MCNC: 12.4 MG/DL
WBC # BLD AUTO: 6.4 K/UL (ref 4.6–13.2)

## 2018-09-11 PROCEDURE — 80053 COMPREHEN METABOLIC PANEL: CPT | Performed by: INTERNAL MEDICINE

## 2018-09-11 PROCEDURE — 85027 COMPLETE CBC AUTOMATED: CPT | Performed by: INTERNAL MEDICINE

## 2018-09-11 PROCEDURE — 80061 LIPID PANEL: CPT | Performed by: INTERNAL MEDICINE

## 2018-09-11 PROCEDURE — 84443 ASSAY THYROID STIM HORMONE: CPT | Performed by: INTERNAL MEDICINE

## 2018-09-11 PROCEDURE — 36415 COLL VENOUS BLD VENIPUNCTURE: CPT | Performed by: INTERNAL MEDICINE

## 2018-09-11 RX ORDER — LANOLIN ALCOHOL/MO/W.PET/CERES
1000 CREAM (GRAM) TOPICAL DAILY
COMMUNITY

## 2018-09-11 RX ORDER — SERTRALINE HYDROCHLORIDE 100 MG/1
100 TABLET, FILM COATED ORAL DAILY
Qty: 30 TAB | Refills: 2 | Status: SHIPPED | OUTPATIENT
Start: 2018-09-11 | End: 2018-10-11 | Stop reason: SDUPTHER

## 2018-09-11 RX ORDER — SERTRALINE HYDROCHLORIDE 50 MG/1
TABLET, FILM COATED ORAL DAILY
COMMUNITY
End: 2018-09-11 | Stop reason: SDUPTHER

## 2018-09-11 NOTE — PATIENT INSTRUCTIONS
Please be sure to follow up with Dr. Genaro Woodall.  
  
Recovering From Depression: Care Instructions Your Care Instructions Taking good care of yourself is important as you recover from depression. In time, your symptoms will fade as your treatment takes hold. Do not give up. Instead, focus your energy on getting better. Your mood will improve. It just takes some time. Focus on things that can help you feel better, such as being with friends and family, eating well, and getting enough rest. But take things slowly. Do not do too much too soon. You will begin to feel better gradually. Follow-up care is a key part of your treatment and safety. Be sure to make and go to all appointments, and call your doctor if you are having problems. It's also a good idea to know your test results and keep a list of the medicines you take. How can you care for yourself at home? Be realistic · If you have a large task to do, break it up into smaller steps you can handle, and just do what you can. · You may want to put off important decisions until your depression has lifted. If you have plans that will have a major impact on your life, such as marriage, divorce, or a job change, try to wait a bit. Talk it over with friends and loved ones who can help you look at the overall picture first. 
· Reaching out to people for help is important. Do not isolate yourself. Let your family and friends help you. Find someone you can trust and confide in, and talk to that person. · Be patient, and be kind to yourself. Remember that depression is not your fault and is not something you can overcome with willpower alone. Treatment is necessary for depression, just like for any other illness. Feeling better takes time, and your mood will improve little by little. Stay active · Stay busy and get outside. Take a walk, or try some other light exercise. · Talk with your doctor about an exercise program. Exercise can help with mild depression. · Go to a movie or concert. Take part in a [employer] activity or other social gathering. Go to a ball game. · Ask a friend to have dinner with you. Take care of yourself · Eat a balanced diet with plenty of fresh fruits and vegetables, whole grains, and lean protein. If you have lost your appetite, eat small snacks rather than large meals. · Avoid drinking alcohol or using illegal drugs. Do not take medicines that have not been prescribed for you. They may interfere with medicines you may be taking for depression, or they may make your depression worse. · Take your medicines exactly as they are prescribed. You may start to feel better within 1 to 3 weeks of taking antidepressant medicine. But it can take as many as 6 to 8 weeks to see more improvement. If you have questions or concerns about your medicines, or if you do not notice any improvement by 3 weeks, talk to your doctor. · If you have any side effects from your medicine, tell your doctor. Antidepressants can make you feel tired, dizzy, or nervous. Some people have dry mouth, constipation, headaches, sexual problems, or diarrhea. Many of these side effects are mild and will go away on their own after you have been taking the medicine for a few weeks. Some may last longer. Talk to your doctor if side effects are bothering you too much. You might be able to try a different medicine. · Get enough sleep. If you have problems sleeping: ¨ Go to bed at the same time every night, and get up at the same time every morning. ¨ Keep your bedroom dark and quiet. ¨ Do not exercise after 5:00 p.m. ¨ Avoid drinks with caffeine after 5:00 p.m. · Avoid sleeping pills unless they are prescribed by the doctor treating your depression. Sleeping pills may make you groggy during the day, and they may interact with other medicine you are taking.  
· If you have any other illnesses, such as diabetes, heart disease, or high blood pressure, make sure to continue with your treatment. Tell your doctor about all of the medicines you take, including those with or without a prescription. · Keep the numbers for these national suicide hotlines: 9-612-438-TALK (7-183.378.1126) and 8-740-MMNXDOK (2-970.940.3700). If you or someone you know talks about suicide or feeling hopeless, get help right away. When should you call for help? Call 911 anytime you think you may need emergency care. For example, call if: 
  · You feel like hurting yourself or someone else.  
  · Someone you know has depression and is about to attempt or is attempting suicide.  
[facility] your doctor now or seek immediate medical care if: 
  · You hear voices.  
  · Someone you know has depression and: 
¨ Starts to give away his or her possessions. ¨ Uses illegal drugs or drinks alcohol heavily. ¨ Talks or writes about death, including writing suicide notes or talking about guns, knives, or pills. ¨ Starts to spend a lot of time alone. ¨ Acts very aggressively or suddenly appears calm.  
 Watch closely for changes in your health, and be sure to contact your doctor if: 
  · You do not get better as expected. Where can you learn more? Go to http://lizy-maylin.info/. Enter G246 in the search box to learn more about \"Recovering From Depression: Care Instructions. \" Current as of: December 7, 2017 Content Version: 11.7 © 5768-5177 [employer]. Care instructions adapted under license by [employer] (which disclaims liability or warranty for this information). If you have questions about a medical condition or this instruction, always ask your healthcare professional. [place] [address] any warranty or liability for your use of this information.

## 2018-09-11 NOTE — MR AVS SNAPSHOT
Vianey Bello [place] 879 68 [facility] Timothy. 320 [facility] 83 [address] 
[phone] Patient: Reg Paredes MRN: [id number] FHI:8/45/6342 Visit Information Date & Time Provider Department Dept. Phone Encounter #  
 9/11/2018  2:45 PM Prateek Thompson, [address] [place] [address] [phone] [id number] Follow-up Instructions Return in about 4 weeks (around 10/9/2018) for 30 minute slot . 4/9/2019 11:15 AM  
Any with Mango Herron MD  
[facility]. [facility] 98 [facility]) Appt Note: Return in about 1 year (around 4/3/2019) for Same day PSA. [address] [address]  
[phone]  
  
   
 [place] [id number] Upcoming Health Maintenance Date Due DTaP/Tdap/Td series (1 - Tdap) 6/15/1959 ZOSTER VACCINE AGE 60> 4/15/1998 GLAUCOMA SCREENING Q2Y 6/15/2003 Pneumococcal 65+ High/Highest Risk (1 of 2 - PCV13) 6/15/2003 MEDICARE YEARLY EXAM 3/14/2018 Influenza Age 5 to Adult 8/1/2018 Allergies as of 9/11/2018  Review Complete On: 9/11/2018 By: Prateek Thompson MD  
 No Known Allergies Current Immunizations  Reviewed on 9/11/2018 Name Date Influenza Vaccine 9/3/2018 Reviewed by Prateek Thompson MD on 9/11/2018 at  3:08 PM  
You Were Diagnosed With   
  
 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 
ICD-9-CM: 296.35 Essential hypertension     ICD-10-CM: I10 
ICD-9-CM: 401.9 Cerebrovascular accident (CVA), unspecified mechanism ([facility]     ICD-10-CM: I63.9 ICD-9-CM: 434.91 Prostate CA [facility])     ICD-10-CM: H48 ICD-9-CM: 517 Hypercholesteremia     ICD-10-CM: E78.00 ICD-9-CM: 272.0 Vitals BP Pulse Temp Resp Height(growth percentile) Weight(growth percentile) 126/60 81 96.6 °F (35.9 °C) (Oral) 16 5' 8\" (1.727 m) 182 lb (82.6 kg) SpO2 BMI Smoking Status 98% 27.67 kg/m2 Never Smoker BMI and BSA Data Body Mass Index Body Surface Area  
 27.67 kg/m 2 1.99 m 2 Preferred Pharmacy Pharmacy Name Phone [facility]/PHARMACY #[id number] Negri, [address] [facility] Heather Hodge [phone] Your Updated Medication List  
  
   
This list is accurate as of 9/11/18  3:08 PM.  Always use your most recent med list. amLODIPine 10 mg tablet Commonly known as:  Aranza Veena Take  by mouth daily. aspirin-dipyridamole  mg per SR capsule Commonly known as:  AGGRENOX Take 1 Cap by mouth two (2) times a day. atorvastatin 10 mg tablet Commonly known as:  LIPITOR Take  by mouth daily. furosemide 20 mg tablet Commonly known as:  LASIX Take 20 mg by mouth. SENSIPAR 30 mg tablet Generic drug:  cinacalcet Take 30 mg by mouth daily. sertraline 100 mg tablet Commonly known as:  ZOLOFT Take 1 Tab by mouth daily. sevelamer carbonate 800 mg Tab tab Commonly known as:  Melody Beagle Take 800 mg by mouth three (3) times daily. tamsulosin 0.4 mg capsule Commonly known as:  FLOMAX TAKE 1 CAPSULE BY MOUTH EVERY DAY 1/2 HOUR FOLLOWING THE SAME MEAL EACH DAY  
  
 VITAMIN B-12 500 mcg tablet Generic drug:  cyanocobalamin Take 500 mcg by mouth daily. Prescriptions Sent to Pharmacy Refills  
 sertraline (ZOLOFT) 100 mg tablet 2 Sig: Take 1 Tab by mouth daily. Class: Normal  
 Pharmacy: [address] [place] [address] #: [phone] Route: Oral  
  
Follow-up Instructions Return in about 4 weeks (around 10/9/2018) for 30 minute slot . To-Do List   
 09/11/2018 Lab:  CBC W/O DIFF   
  
 09/11/2018 Lab:  LIPID PANEL   
  
 09/11/2018 Lab: METABOLIC PANEL, COMPREHENSIVE   
  
 09/11/2018 Lab:  TSH 3RD GENERATION Patient Instructions Please be sure to follow up with Dr. Carmen Brower.  
  
Recovering From Depression: Care Instructions Your Care Instructions Taking good care of yourself is important as you recover from depression. In time, your symptoms will fade as your treatment takes hold. Do not give up. Instead, focus your energy on getting better. Your mood will improve. It just takes some time. Focus on things that can help you feel better, such as being with friends and family, eating well, and getting enough rest. But take things slowly. Do not do too much too soon. You will begin to feel better gradually. Follow-up care is a key part of your treatment and safety. Be sure to make and go to all appointments, and call your doctor if you are having problems. It's also a good idea to know your test results and keep a list of the medicines you take. How can you care for yourself at home? Be realistic · If you have a large task to do, break it up into smaller steps you can handle, and just do what you can. · You may want to put off important decisions until your depression has lifted. If you have plans that will have a major impact on your life, such as marriage, divorce, or a job change, try to wait a bit. Talk it over with friends and loved ones who can help you look at the overall picture first. 
· Reaching out to people for help is important. Do not isolate yourself. Let your family and friends help you. Find someone you can trust and confide in, and talk to that person. · Be patient, and be kind to yourself. Remember that depression is not your fault and is not something you can overcome with willpower alone. Treatment is necessary for depression, just like for any other illness. Feeling better takes time, and your mood will improve little by little. Stay active · Stay busy and get outside. Take a walk, or try some other light exercise. · Talk with your doctor about an exercise program. Exercise can help with mild depression. · Go to a movie or concert. Take part in a [employer] activity or other social gathering. Go to a ball game. · Ask a friend to have dinner with you. Take care of yourself · Eat a balanced diet with plenty of fresh fruits and vegetables, whole grains, and lean protein. If you have lost your appetite, eat small snacks rather than large meals. · Avoid drinking alcohol or using illegal drugs. Do not take medicines that have not been prescribed for you. They may interfere with medicines you may be taking for depression, or they may make your depression worse. · Take your medicines exactly as they are prescribed. You may start to feel better within 1 to 3 weeks of taking antidepressant medicine. But it can take as many as 6 to 8 weeks to see more improvement. If you have questions or concerns about your medicines, or if you do not notice any improvement by 3 weeks, talk to your doctor. · If you have any side effects from your medicine, tell your doctor. Antidepressants can make you feel tired, dizzy, or nervous. Some people have dry mouth, constipation, headaches, sexual problems, or diarrhea. Many of these side effects are mild and will go away on their own after you have been taking the medicine for a few weeks. Some may last longer. Talk to your doctor if side effects are bothering you too much. You might be able to try a different medicine. · Get enough sleep. If you have problems sleeping: ¨ Go to bed at the same time every night, and get up at the same time every morning. ¨ Keep your bedroom dark and quiet. ¨ Do not exercise after 5:00 p.m. ¨ Avoid drinks with caffeine after 5:00 p.m. · Avoid sleeping pills unless they are prescribed by the doctor treating your depression.  Sleeping pills may make you groggy during the day, and they may interact with other medicine you are taking. · If you have any other illnesses, such as diabetes, heart disease, or high blood pressure, make sure to continue with your treatment. Tell your doctor about all of the medicines you take, including those with or without a prescription. · Keep the numbers for these national suicide hotlines: 1-237-949-TALK (4-912.962.6083) and 3-783-MJZMDBB (4-761.719.6397). If you or someone you know talks about suicide or feeling hopeless, get help right away. When should you call for help? Call 911 anytime you think you may need emergency care. For example, call if: 
  · You feel like hurting yourself or someone else.  
  · Someone you know has depression and is about to attempt or is attempting suicide.  
[facility] your doctor now or seek immediate medical care if: 
  · You hear voices.  
  · Someone you know has depression and: 
¨ Starts to give away his or her possessions. ¨ Uses illegal drugs or drinks alcohol heavily. ¨ Talks or writes about death, including writing suicide notes or talking about guns, knives, or pills. ¨ Starts to spend a lot of time alone. ¨ Acts very aggressively or suddenly appears calm.  
 Watch closely for changes in your health, and be sure to contact your doctor if: 
  · You do not get better as expected. Where can you learn more? Go to http://lizy-maylin.info/. Enter S037 in the search box to learn more about \"Recovering From Depression: Care Instructions. \" Current as of: December 7, 2017 Content Version: 11.7 © 1466-2475 [employer]. Care instructions adapted under license by [employer] (which disclaims liability or warranty for this information). If you have questions about a medical condition or this instruction, always ask your healthcare professional. [address] any warranty or liability for your use of this information. Introducing [facility] & HEALTH SERVICES! [employer] introduces [employer] patient portal. Now you can access parts of your medical record, email your doctor's office, and request medication refills online. 1. In your internet browser, go to [email or web address] 2. Click on the First Time User? Click Here link in the Sign In box. You will see the New Member Sign Up page. 3. Enter your [employer] Access Code exactly as it appears below. You will not need to use this code after youve completed the sign-up process. If you do not sign up before the expiration date, you must request a new code. · [employer] Access Code: [email or web address] Expires: 12/10/2018  3:05 PM 
 
4. Enter the last four digits of your Social Security Number (xxxx) and Date of Birth (mm/dd/yyyy) as indicated and click Submit. You will be taken to the next sign-up page. 5. Create a In[employer] Media ID. This will be your [employer] login ID and cannot be changed, so think of one that is secure and easy to remember. 6. Create a [employer] password. You can change your password at any time. 7. Enter your Password Reset Question and Answer. This can be used at a later time if you forget your password. 8. Enter your e-mail address. You will receive e-mail notification when new information is available in [address] 9. Click Sign Up. You can now view and download portions of your medical record. 10. Click the Download Summary menu link to download a portable copy of your medical information. If you have questions, please visit the Frequently Asked Questions section of the [employer] website. Remember, [employer] is NOT to be used for urgent needs. For medical emergencies, dial 911. Now available from your iPhone and Android! Please provide this summary of care documentation to your next provider. Your primary care clinician is listed as MORGAN Cleveland. If you have any questions after today's visit, please call [phone].

## 2018-09-11 NOTE — PROGRESS NOTES
Chief Complaint Patient presents with  Follow Up Chronic Condition  Chronic Kidney Disease  Hypertension  Depression  Other  
  h/o Prostate Cancer and CVA

## 2018-10-11 ENCOUNTER — OFFICE VISIT (OUTPATIENT)
Dept: FAMILY MEDICINE CLINIC | Age: 78
End: 2018-10-11

## 2018-10-11 VITALS
HEIGHT: 68 IN | WEIGHT: 181.8 LBS | RESPIRATION RATE: 17 BRPM | DIASTOLIC BLOOD PRESSURE: 72 MMHG | HEART RATE: 77 BPM | SYSTOLIC BLOOD PRESSURE: 149 MMHG | TEMPERATURE: 95.7 F | BODY MASS INDEX: 27.55 KG/M2

## 2018-10-11 DIAGNOSIS — Z99.2 CKD (CHRONIC KIDNEY DISEASE) REQUIRING CHRONIC DIALYSIS (HCC): ICD-10-CM

## 2018-10-11 DIAGNOSIS — E78.2 MIXED HYPERLIPIDEMIA: ICD-10-CM

## 2018-10-11 DIAGNOSIS — N18.6 CKD (CHRONIC KIDNEY DISEASE) REQUIRING CHRONIC DIALYSIS (HCC): ICD-10-CM

## 2018-10-11 DIAGNOSIS — F33.41 RECURRENT MAJOR DEPRESSIVE DISORDER, IN PARTIAL REMISSION (HCC): Primary | ICD-10-CM

## 2018-10-11 DIAGNOSIS — D53.9 MACROCYTIC ANEMIA: ICD-10-CM

## 2018-10-11 RX ORDER — SERTRALINE HYDROCHLORIDE 100 MG/1
200 TABLET, FILM COATED ORAL DAILY
Qty: 60 TAB | Refills: 2 | Status: SHIPPED | OUTPATIENT
Start: 2018-10-11 | End: 2018-11-05 | Stop reason: SDUPTHER

## 2018-10-11 RX ORDER — ATORVASTATIN CALCIUM 10 MG/1
10 TABLET, FILM COATED ORAL DAILY
Qty: 30 TAB | Refills: 2 | Status: SHIPPED | OUTPATIENT
Start: 2018-10-11 | End: 2019-03-06 | Stop reason: SDUPTHER

## 2018-10-11 RX ORDER — AMLODIPINE BESYLATE 5 MG/1
5 TABLET ORAL DAILY
COMMUNITY
End: 2018-10-26 | Stop reason: SDUPTHER

## 2018-10-11 NOTE — PROGRESS NOTES
1. Have you been to the ER, urgent care clinic since your last visit? Hospitalized since your last visit? No.  
 
2. Have you seen or consulted any other health care providers outside of the [address] [place] [address] since your last visit? Include any pap smears or colon screening. No.  
 
Chief Complaint Patient presents with  Follow Up Chronic Condition  Chronic Kidney Disease  Hypertension  Depression  Prostate Cancer  Results  
  labs

## 2018-10-11 NOTE — MR AVS SNAPSHOT
Vianey Gupta 879 68 [facility] Timothy. 320 [facility] 83 [address] 
[phone] Patient: Jluis Sanderson MRN: [id number] NEV:8/[birth year] Visit Information Date & Time Provider Department Dept. Phone Encounter #  
 10/11/2018  9:30 AM Kami Dc, [address] [place] [address] [phone] [id number] Follow-up Instructions Return in about 3 months (around 1/11/2019) for for [address] (please bring in vaccine record from dialysis). 4/9/2019 11:15 AM  
Any with Payton Alvarado MD  
[facility]. [facility]) Appt Note: Return in about 1 year (around 4/3/2019) for Same day PSA. [address] [address]  
[phone]  
  
   
 [facility] 68 [id number] Upcoming Health Maintenance Date Due DTaP/Tdap/Td series (1 - Tdap) 6/15/1959 Shingrix Vaccine Age 50> (1 of 2) 6/15/1988 GLAUCOMA SCREENING Q2Y 6/15/2003 Pneumococcal 65+ High/Highest Risk (1 of 2 - PCV13) 6/15/2003 MEDICARE YEARLY EXAM 3/14/2018 Allergies as of 10/11/2018  Review Complete On: 10/11/2018 By: Kami Dc MD  
 No Known Allergies Current Immunizations  Reviewed on 9/11/2018 Name Date Influenza Vaccine 9/3/2018 Not reviewed this visit You Were Diagnosed With   
  
 Codes Comments Recurrent major depressive disorder, in partial remission [facility]    -  Primary ICD-10-CM: F33.41 
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  
 27.64 kg/m 2 1.99 m 2 Preferred Pharmacy Pharmacy Name Phone [facility]/PHARMACY #[id number] Menendez, [address] Anuradha Mccormick [phone] Your Updated Medication List  
  
   
This list is accurate as of 10/11/18 10:01 AM.  Always use your most recent med list. amLODIPine 5 mg tablet Commonly known as:  Drew Candie Take 5 mg by mouth daily. aspirin-dipyridamole  mg per SR capsule Commonly known as:  AGGRENOX Take 1 Cap by mouth two (2) times a day. atorvastatin 10 mg tablet Commonly known as:  LIPITOR Take 1 Tab by mouth daily. furosemide 20 mg tablet Commonly known as:  LASIX Take 20 mg by mouth. SENSIPAR 30 mg tablet Generic drug:  cinacalcet Take 30 mg by mouth daily. sertraline 100 mg tablet Commonly known as:  ZOLOFT Take 2 Tabs by mouth daily. sevelamer carbonate 800 mg Tab tab Commonly known as:  Guy Lofts Take 800 mg by mouth three (3) times daily. tamsulosin 0.4 mg capsule Commonly known as:  FLOMAX TAKE 1 CAPSULE BY MOUTH EVERY DAY 1/2 HOUR FOLLOWING THE SAME MEAL EACH DAY  
  
 VITAMIN B-12 500 mcg tablet Generic drug:  cyanocobalamin Take 500 mcg by mouth daily. Prescriptions Sent to Pharmacy Refills  
 sertraline (ZOLOFT) 100 mg tablet 2 Sig: Take 2 Tabs by mouth daily. Class: Normal  
 Pharmacy: [address] [place] [address] Ph #: [phone] Route: Oral  
 atorvastatin (LIPITOR) 10 mg tablet 2 Sig: Take 1 Tab by mouth daily. Class: Normal  
 Pharmacy: [address] [place] [address] Ph #: [phone] Route: Oral  
  
Follow-up Instructions Return in about 3 months (around 1/11/2019) for for 6 [facility] (please bring in vaccine record from dialysis). Patient Instructions Please ask dialysis to give you any vaccine information they have and bring this to your next visit here. Introducing [facility] & HEALTH SERVICES! [facility] introduces [employer]al [employer] patient portal. Now you can access parts of your medical record, email your doctor's office, and request medication refills online. 1. In your internet browser, go to [email or web address] 2. Click on the First Time User? Click Here link in the Sign In box. You will see the New Member Sign Up page. 3. Enter your Ide[employer] Access Code exactly as it appears below. You will not need to use this code after youve completed the sign-up process. If you do not sign up before the expiration date, you must request a new code. · [employer] Access Code: [email or web address] Expires: 12/10/2018  3:05 PM 
 
4. Enter the last four digits of your Social Security Number (xxxx) and Date of Birth (mm/dd/yyyy) as indicated and click Submit. You will be taken to the next sign-up page. 5. Create a Ideal Binary ID. This will be your [employer] login ID and cannot be changed, so think of one that is secure and easy to remember. 6. Create a Ide[employer] password. You can change your password at any time. 7. Enter your Password Reset Question and Answer. This can be used at a later time if you forget your password. 8. Enter your e-mail address. You will receive e-mail notification when new information is available in [address] 9. Click Sign Up. You can now view and download portions of your medical record. 10. Click the Download Summary menu link to download a portable copy of your medical information. If you have questions, please visit the Frequently Asked Questions section of the [employer] website. Remember, [employer] is NOT to be used for urgent needs. For medical emergencies, dial 911. Now available from your iPhone and Android! Please provide this summary of care documentation to your next provider. Your primary care clinician is listed as MORGAN Cleveland.  If you have any questions after today's visit, please call [phone].

## 2018-10-11 NOTE — PATIENT INSTRUCTIONS
Please ask dialysis to give you any vaccine information they have and bring this to your next visit here.

## 2018-10-11 NOTE — PROGRESS NOTES
Maikel Caputo is a [de-identified] y.o. male and presents with Follow Up Chronic Condition; Chronic Kidney Disease; Hypertension; Depression; Prostate Cancer; and Results (labs) Subjective: HTN- taking all meds. No cp or sob. Gets some chest pressure but had stress test at cardiology office- Sees Dr. Gaurav Sanchez 
CKD on HD- sees Dr. Errol Hall. Doing well on 3 times/week HD. Depression- feeling back to normal self- depression controlled. Pt increased zoloft to 200 mg daily on his own several weeks ago. No SI/HI. Prostate Ca- no urinary sx. Sees urology pt reports.  
  
Assessment/Plan:   
htn- controlled- continue all meds. No changes. Chest pressure- keep cardiology follow up discussed with pt again today. - negative stress testing. CKD- continue to f/u with Dr. Errol Hall and HD Depression- now controlled. Continue zoloft at 200mg (no adjustment for renal failure needed) -  No need for counselling at this point. Pt strongly cautioned not to change any doses of med without physician input. Prostate CA- keep f/u with urology. Diagnoses and all orders for this visit: 1. Recurrent major depressive disorder, in partial remission (HCC) 
-     sertraline (ZOLOFT) 100 mg tablet; Take 2 Tabs by mouth daily. 2. Mixed hyperlipidemia 3. 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 4. Macrocytic anemia- check B12 and folate before next visit. Other orders 
-     atorvastatin (LIPITOR) 10 mg tablet; Take 1 Tab by mouth daily. RTC in 3 months. With labs Orders Placed This Encounter  VITAMIN B12 Standing Status:   Future Standing Expiration Date:   10/12/2019  FOLATE Standing Status:   Future Standing Expiration Date:   10/12/2019  
 HGB & HCT Standing Status:   Future Standing Expiration Date:   10/12/2019  
 amLODIPine (NORVASC) 5 mg tablet Sig: Take 5 mg by mouth daily.  sertraline (ZOLOFT) 100 mg tablet Sig: Take 2 Tabs by mouth daily. Dispense:  60 Tab Refill:  2  
 atorvastatin (LIPITOR) 10 mg tablet Sig: Take 1 Tab by mouth daily. Dispense:  30 Tab Refill:  2 Diagnoses and all orders for this visit: 1. Recurrent major depressive disorder, in partial remission (HCC) 
-     sertraline (ZOLOFT) 100 mg tablet; Take 2 Tabs by mouth daily. 2. Mixed hyperlipidemia 3. 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 Macrocytic anemia 
-     VITAMIN B12; Future 
-     FOLATE; Future 
-     HGB & HCT; Future Other orders 
-     atorvastatin (LIPITOR) 10 mg tablet; Take 1 Tab by mouth daily. ROS: 
Negative except as mentioned above Cardiac- no chest pain or palpitations Pulmonary- no sob or wheezes GI- no n/v or diarrhea. SH: Social History Substance Use Topics  Smoking status: Never Smoker  Smokeless tobacco: Never Used  Alcohol use 4.8 oz/week 1 Cans of beer, 5 Shots of liquor per week Comment: occasional  
 
 
 
Medications/Allergies: 
Current Outpatient Prescriptions on File Prior to Visit Medication Sig Dispense Refill  cyanocobalamin (VITAMIN B-12) 500 mcg tablet Take 500 mcg by mouth daily.  sertraline (ZOLOFT) 100 mg tablet Take 1 Tab by mouth daily. 30 Tab 2  
 tamsulosin (FLOMAX) 0.4 mg capsule TAKE 1 CAPSULE BY MOUTH EVERY DAY 1/2 HOUR FOLLOWING THE SAME MEAL EACH DAY  3  
 cinacalcet (SENSIPAR) 30 mg tablet Take 30 mg by mouth daily.  atorvastatin (LIPITOR) 10 mg tablet Take  by mouth daily.  aspirin-dipyridamole (AGGRENOX)  mg per SR capsule Take 1 Cap by mouth two (2) times a day.  sevelamer carbonate (RENVELA) 800 mg tab tab Take 800 mg by mouth three (3) times daily.  furosemide (LASIX) 20 mg tablet Take 20 mg by mouth. No current facility-administered medications on file prior to visit. No Known Allergies Objective: 
Visit Vitals  /72  Pulse 77  Temp 95.7 °F (35.4 °C) (Oral)  Resp 17  Ht 5' 8\" (1.727 m)  Wt 181 lb 12.8 oz (82.5 kg)  BMI 27.64 kg/m2 Body mass index is 27.64 kg/(m^2). Constitutional: Well developed, nourished, no distress, alert Eyes: Conjunctiva normal. PERRL. CV: S1, S2.  RRR. No murmurs/rubs. No thrills palpated. No carotid bruits. Intact distal pulses. No edema. Pulm: No abnormalities on inspection. Clear to auscultation bilaterally. No wheezing/rhonchi. Normal effort. GI: Soft, nontender, nondistended. Normal active bowel sounds. No  masses on palpation. No hepatosplenomegaly.

## 2018-10-26 DIAGNOSIS — F33.41 RECURRENT MAJOR DEPRESSIVE DISORDER, IN PARTIAL REMISSION (HCC): ICD-10-CM

## 2018-10-26 RX ORDER — AMLODIPINE BESYLATE 5 MG/1
5 TABLET ORAL DAILY
Qty: 90 TAB | Refills: 1 | Status: SHIPPED | OUTPATIENT
Start: 2018-10-26 | End: 2019-03-06 | Stop reason: SDUPTHER

## 2018-11-05 DIAGNOSIS — F33.41 RECURRENT MAJOR DEPRESSIVE DISORDER, IN PARTIAL REMISSION (HCC): ICD-10-CM

## 2018-11-06 RX ORDER — SERTRALINE HYDROCHLORIDE 100 MG/1
200 TABLET, FILM COATED ORAL DAILY
Qty: 60 TAB | Refills: 2 | Status: SHIPPED | OUTPATIENT
Start: 2018-11-06 | End: 2019-04-11 | Stop reason: SDUPTHER

## 2018-12-27 ENCOUNTER — HOSPITAL ENCOUNTER (OUTPATIENT)
Dept: LAB | Age: 78
Discharge: HOME | End: 2018-12-27
Payer: MEDICARE

## 2018-12-27 DIAGNOSIS — D53.9 MACROCYTIC ANEMIA: ICD-10-CM

## 2018-12-27 LAB
FOLATE SERPL-MCNC: 9.5 NG/ML (ref 3.1–17.5)
HCT VFR BLD AUTO: 33.2 % (ref 36–48)
HGB BLD-MCNC: 11.1 G/DL (ref 13–16)
VIT B12 SERPL-MCNC: >2000 PG/ML (ref 211–911)

## 2018-12-27 PROCEDURE — 82607 VITAMIN B-12: CPT

## 2018-12-27 PROCEDURE — 36415 COLL VENOUS BLD VENIPUNCTURE: CPT

## 2018-12-27 PROCEDURE — 85018 HEMOGLOBIN: CPT

## 2018-12-27 PROCEDURE — 82746 ASSAY OF FOLIC ACID SERUM: CPT

## 2019-01-15 ENCOUNTER — OFFICE VISIT (OUTPATIENT)
Dept: FAMILY MEDICINE CLINIC | Age: 79
End: 2019-01-15

## 2019-01-15 VITALS
WEIGHT: 189 LBS | OXYGEN SATURATION: 97 % | TEMPERATURE: 95.1 F | RESPIRATION RATE: 16 BRPM | SYSTOLIC BLOOD PRESSURE: 164 MMHG | DIASTOLIC BLOOD PRESSURE: 79 MMHG | HEART RATE: 79 BPM | HEIGHT: 68 IN | BODY MASS INDEX: 28.64 KG/M2

## 2019-01-15 DIAGNOSIS — Z13.39 SCREENING FOR ALCOHOLISM: ICD-10-CM

## 2019-01-15 DIAGNOSIS — Z13.31 SCREENING FOR DEPRESSION: ICD-10-CM

## 2019-01-15 DIAGNOSIS — H91.93 BILATERAL HEARING LOSS, UNSPECIFIED HEARING LOSS TYPE: Primary | ICD-10-CM

## 2019-01-15 PROBLEM — F10.10 ALCOHOL ABUSE: Status: ACTIVE | Noted: 2019-01-15

## 2019-01-15 NOTE — PATIENT INSTRUCTIONS
Medicare Wellness Visit, Male The best way to live healthy is to have a lifestyle where you eat a well-balanced diet, exercise regularly, limit alcohol use, and quit all forms of tobacco/nicotine, if applicable. Regular preventive services are another way to keep healthy. Preventive services (vaccines, screening tests, monitoring & exams) can help personalize your care plan, which helps you manage your own care. Screening tests can find health problems at the earliest stages, when they are easiest to treat. [address] [facility] follows the current, evidence-based guidelines published by the [facility] ([facility]) when recommending preventive services for our patients. Because we follow these guidelines, sometimes recommendations change over time as research supports it. (For example, a prostate screening blood test is no longer routinely recommended for men with no symptoms.) Of course, you and your doctor may decide to screen more often for some diseases, based on your risk and co-morbidities (chronic disease you are already diagnosed with). Preventive services for you include: - Medicare offers their members a free annual wellness visit, which is time for you and your primary care provider to discuss and plan for your preventive service needs. Take advantage of this benefit every year! 
-All adults over age 72 should receive the recommended pneumonia vaccines. Current USPSTF guidelines recommend a series of two vaccines for the best pneumonia protection.  
-All adults should have a flu vaccine yearly and an ECG.  All adults age 61 and older should receive a shingles vaccine once in their lifetime.   
-All adults age 38-68 who are overweight should have a diabetes screening test once every three years.  
-Other screening tests & preventive services for persons with diabetes include: an eye exam to screen for diabetic retinopathy, a kidney function test, a foot exam, and stricter control over your cholesterol.  
-Cardiovascular screening for adults with routine risk involves an electrocardiogram (ECG) at intervals determined by the provider.  
-Colorectal cancer screening should be done for adults age 54-65 with no increased risk factors for colorectal cancer. There are a number of acceptable methods of screening for this type of cancer. Each test has its own benefits and drawbacks. Discuss with your provider what is most appropriate for you during your annual wellness visit. The different tests include: colonoscopy (considered the best screening method), a fecal occult blood test, a fecal DNA test, and sigmoidoscopy. 
-All adults born between [facility] should be screened once for Hepatitis C. 
-An Abdominal Aortic Aneurysm (AAA) Screening is recommended for men age 73-68 who has ever smoked in their lifetime. Here is a list of your current Health Maintenance items (your personalized list of preventive services) with a due date: 
Health Maintenance Due Topic Date Due  
 DTaP/Tdap/Td  (1 - Tdap) 06/15/1959  Shingles Vaccine (1 of 2) 06/15/1988  Pneumococcal Vaccine (1 of 2 - PCV13) 06/15/2003 [address] [place] [address] Annual Well Visit  03/14/2018 Advance Directives: Care Instructions Your Care Instructions An advance directive is a legal way to state your wishes at the end of your life. It tells your family and your doctor what to do if you can no longer say what you want. There are two main types of advance directives. You can change them any time that your wishes change. · A living will tells your family and your doctor your wishes about life support and other treatment. · A durable power of  for health care lets you name a person to make treatment decisions for you when you can't speak for yourself. This person is called a health care agent.  
If you do not have an advance directive, decisions about your medical care may be made by a doctor or a  who doesn't know you. It may help to think of an advance directive as a gift to the people who care for you. If you have one, they won't have to make tough decisions by themselves. Follow-up care is a key part of your treatment and safety. Be sure to make and go to all appointments, and call your doctor if you are having problems. It's also a good idea to know your test results and keep a list of the medicines you take. How can you care for yourself at home? · Discuss your wishes with your loved ones and your doctor. This way, there are no surprises. · Many states have a unique form. Or you might use a universal form that has been approved by many states. This kind of form can sometimes be completed and stored online. Your electronic copy will then be available wherever you have a connection to the Internet. In most cases, doctors will respect your wishes even if you have a form from a different state. · You don't need a  to do an advance directive. But you may want to get legal advice. · Think about these questions when you prepare an advance directive: 
? Who do you want to make decisions about your medical care if you are not able to? Many people choose a family member or close friend. ? Do you know enough about life support methods that might be used? If not, talk to your doctor so you understand. ? What are you most afraid of that might happen? You might be afraid of having pain, losing your independence, or being kept alive by machines. ? Where would you prefer to die? Choices include your home, a hospital, or a nursing home. ? Would you like to have information about hospice care to support you and your family? ? Do you want to donate organs when you die? ? Do you want certain Jewish practices performed before you die? If so, put your wishes in the advance directive. · Read your advance directive every year, and make changes as needed. When should you call for help? Be sure to contact your doctor if you have any questions. Where can you learn more? Go to http://lizy-maylin.info/. Enter R264 in the search box to learn more about \"Advance Directives: Care Instructions. \" Current as of: April 19, 2018 Content Version: 11.8 © 5944-5308 Med Access. Care instructions adapted under license by [employer] (which disclaims liability or warranty for this information). If you have questions about a medical condition or this instruction, always ask your healthcare professional. [address] any warranty or liability for your use of this information. [address] What is a living will? A living will is a legal form you use to write down the kind of care you want at the end of your life. It is used by the health professionals who will treat you if you aren't able to decide for yourself. If you put your wishes in writing, your loved ones and others will know what kind of care you want. They won't need to guess. This can ease your mind and be helpful to others. A living will is not the same as an estate or property will. An estate will explains what you want to happen with your money and property after you die. Is a living will a legal document? A living will is a legal document. Each state has its own laws about living mahoney. If you move to another state, make sure that your living will is legal in the state where you now live. Or you might use a universal form that has been approved by many states. This kind of form can sometimes be completed and stored online. Your electronic copy will then be available wherever you have a connection to the Internet. In most cases, doctors will respect your wishes even if you have a form from a different state. · You don't need an  to complete a living will.  But legal advice can be helpful if your state's laws are unclear, your health history is complicated, or your family can't agree on what should be in your living will. · You can change your living will at any time. Some people find that their wishes about end-of-life care change as their health changes. · In addition to making a living will, think about completing a medical power of  form. This form lets you name the person you want to make end-of-life treatment decisions for you (your \"health care agent\") if you're not able to. Many hospitals and nursing homes will give you the forms you need to complete a living will and a medical power of . · Your living will is used only if you can't make or communicate decisions for yourself anymore. If you become able to make decisions again, you can accept or refuse any treatment, no matter what you wrote in your living will. · Your state may offer an online registry. This is a place where you can store your living will online so the doctors and nurses who need to treat you can find it right away. What should you think about when creating a living will? Talk about your end-of-life wishes with your family members and your doctor. Let them know what you want. That way the people making decisions for you won't be surprised by your choices. Think about these questions as you make your living will: · Do you know enough about life support methods that might be used? If not, talk to your doctor so you know what might be done if you can't breathe on your own, your heart stops, or you're unable to swallow. · What things would you still want to be able to do after you receive life-support methods? Would you want to be able to walk? To speak? To eat on your own? To live without the help of machines? · If you have a choice, where do you want to be cared for? In your home? At a hospital or nursing home? · Do you want certain Hoahaoism practices performed if you become very ill? · If you have a choice at the end of your life, where would you prefer to die? At home? In a hospital or nursing home? Somewhere else? · Would you prefer to be buried or cremated? · Do you want your organs to be donated after you die? What should you do with your living will? · Make sure that your family members and your health care agent have copies of your living will. · Give your doctor a copy of your living will to keep in your medical record. If you have more than one doctor, make sure that each one has a copy. · You may want to put a copy of your living will where it can be easily found. Where can you learn more? Go to http://lizy-maylin.info/. Enter O597 in the search box to learn more about \"Learning About Living Perroy. \" Current as of: April 19, 2018 Content Version: 11.8 © 1506-6376 Healthwise, [employer]orporated. Care instructions adapted under license by [employer] (which disclaims liability or warranty for this information). If you have questions about a medical condition or this instruction, always ask your healthcare professional. [address] any warranty or liability for your use of this information.

## 2019-01-15 NOTE — PROGRESS NOTES
1. Have you been to the ER, urgent care clinic since your last visit? Hospitalized since your last visit? No.  
 
2. Have you seen or consulted any other health care providers outside of the [address] [place] [address] since your last visit? Include any pap smears or colon screening. Yes, saw his cardiologist Dr. Leslee Stiles  in 12/2018. Chief Complaint Patient presents with [address] [place] [address] Annual Wellness Visit  Results  
  labs

## 2019-01-15 NOTE — PROGRESS NOTES
This is the Subsequent Medicare Annual Wellness Exam, performed 12 months or more after the Initial AWV or the last Subsequent AWV I have reviewed the patient's medical history in detail and updated the computerized patient record. History Past Medical History:  
Diagnosis Date  Arthritis  Cancer ([facility]  Chronic kidney disease   
 dialysis  Community acquired pneumonia  Decreased exercise tolerance  Depression  Dialysis patient [facility])  Difficulty urinating  Dizziness  Hypercholesteremia  Hypercholesterolemia  Hypertension  Other malignant neoplasm without specification of site  Prostate CA [facility]) 2007  
 s/p combined radiation therapy  Prostate cancer ([facility]  Renal failure  Stroke ([facility]  Urinary frequency Past Surgical History:  
Procedure Laterality Date  HX COLONOSCOPY  2015  HX HEENT  cataract sx  HX HEENT Cataract surgery 2002  HX OTHER SURGICAL  09/10/2014 DIALYSIS FISTULA OR GRAFT  
 HX OTHER SURGICAL  07/20/2015 VENOUS ACCESS CATHETER INSERTION  
 HX OTHER SURGICAL  11/03/2016 VENOUS ACCESS CATHETER INSERTION  
 HX OTHER SURGICAL  11/04/2016 ARTERIOVENOUS FISTULA REVISION  
 HX UROLOGICAL  2007 INSERTION RADIOACTIVE SEEDS Current Outpatient Medications Medication Sig Dispense Refill  sertraline (ZOLOFT) 100 mg tablet Take 2 Tabs by mouth daily. 60 Tab 2  
 amLODIPine (NORVASC) 5 mg tablet Take 1 Tab by mouth daily. 90 Tab 1  
 atorvastatin (LIPITOR) 10 mg tablet Take 1 Tab by mouth daily. 30 Tab 2  cyanocobalamin (VITAMIN B-12) 500 mcg tablet Take 500 mcg by mouth daily.  tamsulosin (FLOMAX) 0.4 mg capsule TAKE 1 CAPSULE BY MOUTH EVERY DAY 1/2 HOUR FOLLOWING THE SAME MEAL EACH DAY  3  
 cinacalcet (SENSIPAR) 30 mg tablet Take 30 mg by mouth daily.  aspirin-dipyridamole (AGGRENOX)  mg per SR capsule Take 1 Cap by mouth two (2) times a day.  sevelamer carbonate (RENVELA) 800 mg tab tab Take 800 mg by mouth three (3) times daily.  furosemide (LASIX) 20 mg tablet Take 20 mg by mouth. No Known Allergies No family history on file. Social History Tobacco Use  Smoking status: Never Smoker  Smokeless tobacco: Never Used Substance Use Topics  Alcohol use: Yes Alcohol/week: 4.8 oz Types: 1 Cans of beer, 5 Shots of liquor per week Comment: occasional  
 
Patient Active Problem List  
Diagnosis Code  Essential hypertension I10  
 History of CVA (cerebrovascular accident) Z80.78  
 Prostate CA ([facility] [facility] C61  Stroke ([facility] I63.9  Cervical strain S16. [place]  Degenerative disc disease, cervical M50.30  Cellulitis of right lower extremity L03.115  
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  
 Mixed hyperlipidemia E78.2  Macrocytic anemia D53.9 Depression Risk Factor Screening: PHQ over the last two weeks 9/11/2018 PHQ Not Done - Little interest or pleasure in doing things Several days Feeling down, depressed, irritable, or hopeless Several days Total Score PHQ 2 2 Alcohol Risk Factor Screening: You average more than 14 drinks a week. - offered psychiatry referral but pt declines. Pt feels he can stop- encouraged to do so. Functional Ability and Level of Safety:  
Hearing Loss Hearing is good. The patient needs further evaluation. Activities of Daily Living The home contains: handrails and grab bars Patient does total self care Fall Risk Fall Risk Assessment, last 12 mths 1/15/2019 Able to walk? Yes Fall in past 12 months? No  
 
 
Abuse Screen Patient is not abused Cognitive Screening Evaluation of Cognitive Function: 
Has your family/caregiver stated any concerns about your memory: no 
Normal 
 
Patient Care Team  
Patient Care Team: Talon Perez MD as PCP - General (Internal Medicine) María Castro MD (Internal Medicine) Lesvia Yancey MD (Ophthalmology) Ruben Tang MD (Nephrology) Assessment/Plan Education and counseling provided: 
Are appropriate based on today's review and evaluation End-of-Life planning (with patient's consent) Diagnoses and all orders for this visit: 1. Screening for alcoholism 2. Screening for depression Gets vaccines including flu/pneumonia and shingles at dialysis pt reports- he will obtain a list of these for us. Health Maintenance Due Topic Date Due  
 DTaP/Tdap/Td series (1 - Tdap) 06/15/1959  Shingrix Vaccine Age 50> (1 of 2) 06/15/1988  Pneumococcal 65+ High/Highest Risk (1 of 2 - PCV13) 06/15/2003  MEDICARE YEARLY EXAM  03/14/2018

## 2019-01-15 NOTE — ACP (ADVANCE CARE PLANNING)
Advance Care Planning (ACP) Provider Conversation Snapshot    Date of ACP Conversation: 01/15/19  Persons included in Conversation:  patient  Length of ACP Conversation in minutes:  <16 minutes (Non-Billable)    Authorized Decision Maker (if patient is incapable of making informed decisions): This person is: Other Legally Authorized Decision Maker (e.g. Next of Kin)          For Patients with Decision Making Capacity:   Values/Goals: Exploration of values, goals, and preferences if recovery is not expected, even with continued medical treatment in the event of:  Imminent death  Severe, permanent brain injury  . \"In these circumstances, what matters most to you? \"  Care focused more on comfort or quality of life.   Kath Wilhelm Outcomes / Follow-Up Plan:   Recommended completion of Advance Directive form after review of ACP materials and conversation with prospective healthcare agent

## 2019-03-06 DIAGNOSIS — F33.41 RECURRENT MAJOR DEPRESSIVE DISORDER, IN PARTIAL REMISSION (HCC): ICD-10-CM

## 2019-03-06 RX ORDER — ATORVASTATIN CALCIUM 10 MG/1
10 TABLET, FILM COATED ORAL DAILY
Qty: 30 TAB | Refills: 2 | Status: SHIPPED | OUTPATIENT
Start: 2019-03-06 | End: 2019-07-09 | Stop reason: SDUPTHER

## 2019-03-06 RX ORDER — AMLODIPINE BESYLATE 5 MG/1
5 TABLET ORAL DAILY
Qty: 90 TAB | Refills: 1 | Status: SHIPPED | OUTPATIENT
Start: 2019-03-06 | End: 2019-09-20 | Stop reason: SDUPTHER

## 2019-03-06 RX ORDER — FUROSEMIDE 20 MG/1
20 TABLET ORAL DAILY
Qty: 30 TAB | Refills: 2 | Status: SHIPPED | OUTPATIENT
Start: 2019-03-06 | End: 2019-03-07 | Stop reason: SDUPTHER

## 2019-03-08 RX ORDER — FUROSEMIDE 20 MG/1
20 TABLET ORAL DAILY
Qty: 30 TAB | Refills: 2 | Status: SHIPPED | OUTPATIENT
Start: 2019-03-08

## 2019-03-27 DIAGNOSIS — N18.5 STAGE 5 CHRONIC KIDNEY DISEASE (HCC): ICD-10-CM

## 2019-03-28 RX ORDER — ASPIRIN AND DIPYRIDAMOLE 25; 200 MG/1; MG/1
1 CAPSULE, EXTENDED RELEASE ORAL 2 TIMES DAILY
Qty: 60 CAP | Refills: 3 | Status: SHIPPED | OUTPATIENT
Start: 2019-03-28 | End: 2019-06-24 | Stop reason: SDUPTHER

## 2019-05-14 ENCOUNTER — HOSPITAL ENCOUNTER (OUTPATIENT)
Dept: LAB | Age: 79
Discharge: HOME | End: 2019-05-14
Payer: MEDICARE

## 2019-05-14 ENCOUNTER — OFFICE VISIT (OUTPATIENT)
Dept: FAMILY MEDICINE CLINIC | Age: 79
End: 2019-05-14

## 2019-05-14 VITALS
RESPIRATION RATE: 16 BRPM | TEMPERATURE: 95.7 F | HEART RATE: 77 BPM | HEIGHT: 68 IN | SYSTOLIC BLOOD PRESSURE: 112 MMHG | DIASTOLIC BLOOD PRESSURE: 67 MMHG | OXYGEN SATURATION: 97 % | WEIGHT: 187 LBS | BODY MASS INDEX: 28.34 KG/M2

## 2019-05-14 DIAGNOSIS — I44.1 MOBITZ TYPE 2 SECOND DEGREE HEART BLOCK: ICD-10-CM

## 2019-05-14 DIAGNOSIS — E78.2 MIXED HYPERLIPIDEMIA: ICD-10-CM

## 2019-05-14 DIAGNOSIS — R06.02 SOB (SHORTNESS OF BREATH): ICD-10-CM

## 2019-05-14 DIAGNOSIS — R06.02 SOB (SHORTNESS OF BREATH): Primary | ICD-10-CM

## 2019-05-14 LAB
ANION GAP SERPL CALC-SCNC: 10 MMOL/L (ref 3–18)
BNP SERPL-MCNC: ABNORMAL PG/ML (ref 0–1800)
BUN SERPL-MCNC: 44 MG/DL (ref 7–18)
BUN/CREAT SERPL: 4 (ref 12–20)
CALCIUM SERPL-MCNC: 8 MG/DL (ref 8.5–10.1)
CHLORIDE SERPL-SCNC: 94 MMOL/L (ref 100–108)
CO2 SERPL-SCNC: 30 MMOL/L (ref 21–32)
CREAT SERPL-MCNC: 10.1 MG/DL (ref 0.6–1.3)
ERYTHROCYTE [DISTWIDTH] IN BLOOD BY AUTOMATED COUNT: 15.5 % (ref 11.6–14.5)
GLUCOSE SERPL-MCNC: 87 MG/DL (ref 74–99)
HCT VFR BLD AUTO: 35.8 % (ref 36–48)
HGB BLD-MCNC: 11.5 G/DL (ref 13–16)
MCH RBC QN AUTO: 33.8 PG (ref 24–34)
MCHC RBC AUTO-ENTMCNC: 32.1 G/DL (ref 31–37)
MCV RBC AUTO: 105.3 FL (ref 74–97)
PLATELET # BLD AUTO: 224 K/UL (ref 135–420)
PMV BLD AUTO: 10.2 FL (ref 9.2–11.8)
POTASSIUM SERPL-SCNC: 5.1 MMOL/L (ref 3.5–5.5)
RBC # BLD AUTO: 3.4 M/UL (ref 4.7–5.5)
SODIUM SERPL-SCNC: 134 MMOL/L (ref 136–145)
TSH SERPL DL<=0.05 MIU/L-ACNC: 1.72 UIU/ML (ref 0.36–3.74)
WBC # BLD AUTO: 6 K/UL (ref 4.6–13.2)

## 2019-05-14 PROCEDURE — 84443 ASSAY THYROID STIM HORMONE: CPT

## 2019-05-14 PROCEDURE — 80048 BASIC METABOLIC PNL TOTAL CA: CPT

## 2019-05-14 PROCEDURE — 36415 COLL VENOUS BLD VENIPUNCTURE: CPT

## 2019-05-14 PROCEDURE — 83880 ASSAY OF NATRIURETIC PEPTIDE: CPT

## 2019-05-14 PROCEDURE — 85027 COMPLETE CBC AUTOMATED: CPT

## 2019-05-14 NOTE — PROGRESS NOTES
Snehal Álvarez is a [de-identified] y.o. male and presents with Chronic Kidney Disease (3 months follow up); Hypertension; Prostate Cancer; and Shortness of Breath (on exertion. ) Subjective: SOB- \"for some time now\". No chest pressure at this point. Getting worse. HTN- taking all meds. No cp.+ sob.   
 had stress test at cardiology office- Sees Dr. Bobo Bryson- has appt sometime soon pt reports. CKD on HD- sees Dr. Wale Squires. Doing well on 3 times/week HD. Assessment/Plan:   
htn- controlled- continue all meds. No changes. Sob-has 2nd degree type 2 block- currently stable but discussed with pt and niece to call 911 if sx worsen. - negative stress testing. Cardiology called as well and pt given appt at 1:15- discussed and he will be sure to keep this appt. Needs labs as well and these were drawn today. Prostate CA- keep f/u with urology. RTC in 3 months. With labs Orders Placed This Encounter  CBC W/O DIFF Standing Status:   Future Standing Expiration Date:   5/14/2020  
 NT-PRO BNP Standing Status:   Future Standing Expiration Date:   5/21/2019  METABOLIC PANEL, BASIC Standing Status:   Future Standing Expiration Date:   5/14/2020  TSH 3RD GENERATION Standing Status:   Future Standing Expiration Date:   5/14/2020  AMB POC EKG ROUTINE W/ 12 LEADS, INTER & REP Order Specific Question:   Reason for Exam: Answer:   Jennifer Maria Diagnoses and all orders for this visit: 
 
1. SOB (shortness of breath) -     AMB POC EKG ROUTINE W/ 12 LEADS, INTER & REP 
-     CBC W/O DIFF; Future 
-     NT-PRO BNP; Future -     METABOLIC PANEL, BASIC; Future 
-     TSH 3RD GENERATION; Future 2. Mobitz type 2 second degree heart block 
-     TSH 3RD GENERATION; Future 3. Mixed hyperlipidemia 
-     TSH 3RD GENERATION; Future ROS: 
Negative except as mentioned above Cardiac- no chest pain or palpitations Pulmonary- no sob or wheezes GI- no n/v or diarrhea.  
 
SH: 
 Social History Tobacco Use  Smoking status: Never Smoker  Smokeless tobacco: Never Used Substance Use Topics  Alcohol use: Yes Alcohol/week: 4.8 oz Types: 1 Cans of beer, 5 Shots of liquor per week Comment: occasional  
 Drug use: No  
 
 
 
Medications/Allergies: 
Current Outpatient Medications on File Prior to Visit Medication Sig Dispense Refill  sertraline (ZOLOFT) 100 mg tablet TAKE 2 TABLETS BY MOUTH EVERY  Tab 1  
 aspirin-dipyridamole (AGGRENOX)  mg per SR capsule Take 1 Cap by mouth two (2) times a day. 60 Cap 3  furosemide (LASIX) 20 mg tablet Take 1 Tab by mouth daily. 30 Tab 2  
 atorvastatin (LIPITOR) 10 mg tablet Take 1 Tab by mouth daily. 30 Tab 2  
 amLODIPine (NORVASC) 5 mg tablet Take 1 Tab by mouth daily. 90 Tab 1  cyanocobalamin (VITAMIN B-12) 500 mcg tablet Take 500 mcg by mouth daily.  tamsulosin (FLOMAX) 0.4 mg capsule TAKE 1 CAPSULE BY MOUTH EVERY DAY 1/2 HOUR FOLLOWING THE SAME MEAL EACH DAY  3  
 cinacalcet (SENSIPAR) 30 mg tablet Take 30 mg by mouth daily.  sevelamer carbonate (RENVELA) 800 mg tab tab Take 800 mg by mouth three (3) times daily. No current facility-administered medications on file prior to visit. No Known Allergies Objective: 
Visit Vitals /67 Pulse 77 Temp 95.7 °F (35.4 °C) (Oral) Resp 16 Ht 5' 8\" (1.727 m) Wt 187 lb (84.8 kg) SpO2 97% BMI 28.43 kg/m² Body mass index is 28.43 kg/m². Constitutional: Well developed, nourished, no distress, alert Eyes: Conjunctiva normal. PERRL. CV: S1, S2.  RRR. No murmurs/rubs. No thrills palpated. No carotid bruits. Intact distal pulses. No edema. Pulm: No abnormalities on inspection. Clear to auscultation bilaterally. No wheezing/rhonchi. Normal effort. GI: Soft, nontender, nondistended. Normal active bowel sounds. No  masses on palpation. No hepatosplenomegaly.

## 2019-05-14 NOTE — PROGRESS NOTES
1. Have you been to the ER, urgent care clinic since your last visit? Hospitalized since your last visit? No.  
 
2. Have you seen or consulted any other health care providers outside of the [address] [place] [address] since your last visit? Include any pap smears or colon screening. Yes, saw his Oncologist in 4/2019. Chief Complaint Patient presents with  Chronic Kidney Disease 3 months follow up  Hypertension  Prostate Cancer  Shortness of Breath  
  on exertion. I called Dr. Abhilash Guthrie (cardio) and scheduled him an appointment for today at 1:15pm for Echo then at 2pm to see Kelsie Mercer NP. Patient was given and understood his appointment information.

## 2019-05-14 NOTE — PATIENT INSTRUCTIONS
You have an appt with Dr. Ede Kim nurse practitioner today at 1:15- please be sure to arrive at least 30 minutes early for this visit. Heart Blocks: Care Instructions Your Care Instructions A heart block is a problem with your heart's electrical system. Normally, a small area of the heart (sinus node) creates the electrical signals that cause the heart to beat in a timed and regular way. A heart block occurs when the signal is blocked. This disrupts the heartbeat. A heart block does not mean that blood flow to the heart is blocked. There are three types of heart blocks. In a first-degree heart block, the signal is slower than normal. But the heart rate is normal, and the heart usually is not damaged. Some medicines may cause a first-degree heart block. In a second-degree heart block, some signals do not reach the lower chambers of the heart. This can cause the heart to skip a beat or have an abnormal rhythm. In a third-degree heart block, the signal is completely blocked from reaching the lower chambers. This can cause the heart to slow down a lot or even stop beating. It is a very serious condition. A first-degree heart block usually does not cause problems and does not need treatment. Second- and third-degree heart blocks can be treated with a pacemaker. A pacemaker helps your heart to beat in an even rhythm. Follow-up care is a key part of your treatment and safety. Be sure to make and go to all appointments, and call your doctor if you are having problems. It's also a good idea to know your test results and keep a list of the medicines you take. How can you care for yourself at home? · If you feel lightheaded, sit or lie down to avoid injury that might occur if you faint and fall. · Get regular exercise. Try for 30 minutes on most days of the week. If you do not have other heart problems, you likely do not have limits on the type or level of activity that you can do.  You may want to walk, swim, bike, or do other activities. Ask your doctor what level of exercise is safe for you. · Get plenty of sleep and rest. If you get overtired, your changes in heart rate or rhythm may be more severe or occur more often. · If you received a pacemaker or an implantable cardioverter-defibrillator (ICD), you will get more information about it. · Wear medical alert jewelry that describes your condition and says you have a pacemaker or ICD. You can buy this at most [employer]. When should you call for help? Call 911 anytime you think you may need emergency care. For example, call if: 
  · You passed out (lost consciousness).  
  · You have symptoms of a heart attack. These may include: 
? Chest pain or pressure, or a strange feeling in the chest. 
? Sweating. ? Shortness of breath. ? Nausea or vomiting. ? Pain, pressure, or a strange feeling in the back, neck, jaw, or upper belly or in one or both shoulders or arms. ? Lightheadedness or sudden weakness. ? A fast or irregular heartbeat. After you call 911, the  may tell you to chew 1 adult-strength or 2 to 4 low-dose aspirin. Wait for an ambulance. Do not try to drive yourself.  
 Call your doctor now or seek immediate medical care if: 
  · You are dizzy or lightheaded, or you feel like you may faint.  
  · You have new or increased shortness of breath.  
 Watch closely for changes in your health, and be sure to contact your doctor if you have any problems. Where can you learn more? Go to http://lizy-maylin.info/. Enter U108 in the search box to learn more about \"Heart Blocks: Care Instructions. \" Current as of: July 22, 2018 Content Version: 11.9 © 5943-0242 [employer]. Care instructions adapted under license by [employer] (which disclaims liability or warranty for this information).  If you have questions about a medical condition or this instruction, always ask your healthcare professional. [employer] disclaims any warranty or liability for your use of this information.

## 2019-06-12 ENCOUNTER — OFFICE VISIT (OUTPATIENT)
Dept: FAMILY MEDICINE CLINIC | Age: 79
End: 2019-06-12

## 2019-06-12 VITALS
TEMPERATURE: 96.6 F | HEIGHT: 68 IN | WEIGHT: 187 LBS | DIASTOLIC BLOOD PRESSURE: 55 MMHG | BODY MASS INDEX: 28.34 KG/M2 | RESPIRATION RATE: 16 BRPM | HEART RATE: 59 BPM | SYSTOLIC BLOOD PRESSURE: 124 MMHG | OXYGEN SATURATION: 97 %

## 2019-06-12 DIAGNOSIS — I10 ESSENTIAL HYPERTENSION: ICD-10-CM

## 2019-06-12 DIAGNOSIS — E78.2 MIXED HYPERLIPIDEMIA: ICD-10-CM

## 2019-06-12 DIAGNOSIS — I44.1 MOBITZ TYPE 2 SECOND DEGREE HEART BLOCK: ICD-10-CM

## 2019-06-12 DIAGNOSIS — Z99.2 CKD (CHRONIC KIDNEY DISEASE) REQUIRING CHRONIC DIALYSIS (HCC): Primary | ICD-10-CM

## 2019-06-12 DIAGNOSIS — N18.6 CKD (CHRONIC KIDNEY DISEASE) REQUIRING CHRONIC DIALYSIS (HCC): Primary | ICD-10-CM

## 2019-06-12 NOTE — PATIENT INSTRUCTIONS
DASH Diet: Care Instructions Your Care Instructions The DASH diet is an eating plan that can help lower your blood pressure. DASH stands for Dietary Approaches to Stop Hypertension. Hypertension is high blood pressure. The DASH diet focuses on eating foods that are high in calcium, potassium, and magnesium. These nutrients can lower blood pressure. The foods that are highest in these nutrients are fruits, vegetables, low-fat dairy products, nuts, seeds, and legumes. But taking calcium, potassium, and magnesium supplements instead of eating foods that are high in those nutrients does not have the same effect. The DASH diet also includes whole grains, fish, and poultry. The DASH diet is one of several lifestyle changes your doctor may recommend to lower your high blood pressure. Your doctor may also want you to decrease the amount of sodium in your diet. Lowering sodium while following the DASH diet can lower blood pressure even further than just the DASH diet alone. Follow-up care is a key part of your treatment and safety. Be sure to make and go to all appointments, and call your doctor if you are having problems. It's also a good idea to know your test results and keep a list of the medicines you take. How can you care for yourself at home? Following the DASH diet · Eat 4 to 5 servings of fruit each day. A serving is 1 medium-sized piece of fruit, ½ cup chopped or canned fruit, 1/4 cup dried fruit, or 4 ounces (½ cup) of fruit juice. Choose fruit more often than fruit juice. · Eat 4 to 5 servings of vegetables each day. A serving is 1 cup of lettuce or raw leafy vegetables, ½ cup of chopped or cooked vegetables, or 4 ounces (½ cup) of vegetable juice. Choose vegetables more often than vegetable juice. · Get 2 to 3 servings of low-fat and fat-free dairy each day. A serving is 8 ounces of milk, 1 cup of yogurt, or 1 ½ ounces of cheese. · Eat 6 to 8 servings of grains each day. A serving is 1 slice of bread, 1 ounce of dry cereal, or ½ cup of cooked rice, pasta, or cooked cereal. Try to choose whole-grain products as much as possible. · Limit lean meat, poultry, and fish to 2 servings each day. A serving is 3 ounces, about the size of a deck of cards. · Eat 4 to 5 servings of nuts, seeds, and legumes (cooked dried beans, lentils, and split peas) each week. A serving is 1/3 cup of nuts, 2 tablespoons of seeds, or ½ cup of cooked beans or peas. · Limit fats and oils to 2 to 3 servings each day. A serving is 1 teaspoon of vegetable oil or 2 tablespoons of salad dressing. · Limit sweets and added sugars to 5 servings or less a week. A serving is 1 tablespoon jelly or jam, ½ cup sorbet, or 1 cup of lemonade. · Eat less than 2,300 milligrams (mg) of sodium a day. If you limit your sodium to 1,500 mg a day, you can lower your blood pressure even more. Tips for success · Start small. Do not try to make dramatic changes to your diet all at once. You might feel that you are missing out on your favorite foods and then be more likely to not follow the plan. Make small changes, and stick with them. Once those changes become habit, add a few more changes. · Try some of the following: ? Make it a goal to eat a fruit or vegetable at every meal and at snacks. This will make it easy to get the recommended amount of fruits and vegetables each day. ? Try yogurt topped with fruit and nuts for a snack or healthy dessert. ? Add lettuce, tomato, cucumber, and onion to sandwiches. ? Combine a ready-made pizza crust with low-fat mozzarella cheese and lots of vegetable toppings. Try using tomatoes, squash, spinach, broccoli, carrots, cauliflower, and onions. ? Have a variety of cut-up vegetables with a low-fat dip as an appetizer instead of chips and dip. ? Sprinkle sunflower seeds or chopped almonds over salads.  Or try adding chopped walnuts or almonds to cooked vegetables. ? Try some vegetarian meals using beans and peas. Add garbanzo or kidney beans to salads. Make burritos and tacos with mashed wyman beans or black beans. Where can you learn more? Go to http://lizy-maylin.info/. Enter Y063 in the search box to learn more about \"DASH Diet: Care Instructions. \" Current as of: July 22, 2018 Content Version: 11.9 © 8930-9901 [employer]. Care instructions adapted under license by [employer] (which disclaims liability or warranty for this information). If you have questions about a medical condition or this instruction, always ask your healthcare professional. [address] any warranty or liability for your use of this information.

## 2019-06-12 NOTE — PROGRESS NOTES
1. Have you been to the ER, urgent care clinic since your last visit? Hospitalized since your last visit? No.     2. Have you seen or consulted any other health care providers outside of the [address] [place] [address] since your last visit? Include any pap smears or colon screening.  No.     Chief Complaint   Patient presents with    Hypertension    Chronic Kidney Disease    Breathing Problem    Results     labs

## 2019-06-12 NOTE — PROGRESS NOTES
Tigre Quintero is a [de-identified] y.o. male and presents with Hypertension; Chronic Kidney Disease; Breathing Problem; and Results (labs)       Subjective:  SOB- mostly resolved- \"for some time now\". No chest pressure at this point. Saw cardiology. had stress test at cardiology office- Sees Dr. Davison.  HTN- taking all meds. No cp.+ sob. CKD on HD- sees Dr. Clifton Delgado. Doing well on 3 times/week HD. Prostate Ca- no longer following with urology. Assessment/Plan:    htn- controlled- continue all meds. No changes. Sob-has 2nd degree type 2 block- s/p evaluation by cardiology. Stable at this time. Prostate CA- no longer has f/u with urology. PSA yearly discussed. CKD- continue HD.       RTC in 3 months. With labs    Orders Placed This Encounter    varicella-zoster recombinant, PF, (SHINGRIX, PF,) 50 mcg/0.5 mL susr injection     Si.5 mL by IntraMUSCular route once for 1 dose. Dispense:  0.5 mL     Refill:  0    varicella-zoster recombinant, PF, (SHINGRIX, PF,) 50 mcg/0.5 mL susr injection     Si.5 mL by IntraMUSCular route once for 1 dose. To be done 2-6 months after initial vaccination. Dispense:  0.5 mL     Refill:  0     Diagnoses and all orders for this visit:    1. 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 Essential hypertension    4. Mixed hyperlipidemia    Other orders  -     varicella-zoster recombinant, PF, (SHINGRIX, PF,) 50 mcg/0.5 mL susr injection; 0.5 mL by IntraMUSCular route once for 1 dose.  -     varicella-zoster recombinant, PF, (SHINGRIX, PF,) 50 mcg/0.5 mL susr injection; 0.5 mL by IntraMUSCular route once for 1 dose. To be done 2-6 months after initial vaccination. ROS:  Negative except as mentioned above  Cardiac- no chest pain or palpitations  Pulmonary- no sob or wheezes  GI- no n/v or diarrhea.     SH:  Social History     Tobacco Use    Smoking status: Never Smoker    Smokeless tobacco: Never Used Substance Use Topics    Alcohol use: Yes     Alcohol/week: 4.8 oz     Types: 1 Cans of beer, 5 Shots of liquor per week     Comment: occasional    Drug use: No         Medications/Allergies:  Current Outpatient Medications on File Prior to Visit   Medication Sig Dispense Refill    sertraline (ZOLOFT) 100 mg tablet TAKE 2 TABLETS BY MOUTH EVERY  Tab 1    aspirin-dipyridamole (AGGRENOX)  mg per SR capsule Take 1 Cap by mouth two (2) times a day. 60 Cap 3    furosemide (LASIX) 20 mg tablet Take 1 Tab by mouth daily. 30 Tab 2    atorvastatin (LIPITOR) 10 mg tablet Take 1 Tab by mouth daily. 30 Tab 2    amLODIPine (NORVASC) 5 mg tablet Take 1 Tab by mouth daily. 90 Tab 1    cyanocobalamin (VITAMIN B-12) 500 mcg tablet Take 500 mcg by mouth daily.  tamsulosin (FLOMAX) 0.4 mg capsule TAKE 1 CAPSULE BY MOUTH EVERY DAY 1/2 HOUR FOLLOWING THE SAME MEAL EACH DAY  3    cinacalcet (SENSIPAR) 30 mg tablet Take 30 mg by mouth daily.  sevelamer carbonate (RENVELA) 800 mg tab tab Take 800 mg by mouth three (3) times daily. No current facility-administered medications on file prior to visit. No Known Allergies    Objective:  Visit Vitals  /55   Pulse (!) 59   Temp 96.6 °F (35.9 °C) (Oral)   Resp 16   Ht 5' 8\" (1.727 m)   Wt 187 lb (84.8 kg)   SpO2 97%   BMI 28.43 kg/m²    Body mass index is 28.43 kg/m². Constitutional: Well developed, nourished, no distress, alert   Eyes: Conjunctiva normal. PERRL. CV: S1, S2.  RRR. No murmurs/rubs. No thrills palpated. No carotid bruits. Intact distal pulses. No edema. Pulm: No abnormalities on inspection. Clear to auscultation bilaterally. No wheezing/rhonchi. Normal effort. GI: Soft, nontender, nondistended. Normal active bowel sounds. No  masses on palpation. No hepatosplenomegaly.

## 2019-06-24 DIAGNOSIS — N18.5 STAGE 5 CHRONIC KIDNEY DISEASE (HCC): ICD-10-CM

## 2019-06-24 RX ORDER — ASPIRIN AND DIPYRIDAMOLE 25; 200 MG/1; MG/1
CAPSULE, EXTENDED RELEASE ORAL
Qty: 180 CAP | Refills: 1 | Status: SHIPPED | OUTPATIENT
Start: 2019-06-24 | End: 2019-12-15 | Stop reason: SDUPTHER

## 2019-07-11 RX ORDER — ATORVASTATIN CALCIUM 10 MG/1
10 TABLET, FILM COATED ORAL DAILY
Qty: 30 TAB | Refills: 2 | Status: SHIPPED | OUTPATIENT
Start: 2019-07-11 | End: 2019-10-03 | Stop reason: SDUPTHER

## 2019-09-20 DIAGNOSIS — F33.41 RECURRENT MAJOR DEPRESSIVE DISORDER, IN PARTIAL REMISSION (HCC): ICD-10-CM

## 2019-09-20 RX ORDER — AMLODIPINE BESYLATE 5 MG/1
TABLET ORAL
Qty: 90 TAB | Refills: 1 | Status: SHIPPED | OUTPATIENT
Start: 2019-09-20 | End: 2019-09-29

## 2019-09-28 ENCOUNTER — HOSPITAL ENCOUNTER (OUTPATIENT)
Age: 79
Setting detail: OBSERVATION
Discharge: HOME | End: 2019-09-29
Attending: EMERGENCY MEDICINE | Admitting: FAMILY MEDICINE
Payer: MEDICARE

## 2019-09-28 ENCOUNTER — APPOINTMENT (OUTPATIENT)
Dept: GENERAL RADIOLOGY | Age: 79
End: 2019-09-28
Attending: PHYSICIAN ASSISTANT
Payer: MEDICARE

## 2019-09-28 DIAGNOSIS — N18.6 CKD (CHRONIC KIDNEY DISEASE) REQUIRING CHRONIC DIALYSIS (HCC): Primary | ICD-10-CM

## 2019-09-28 DIAGNOSIS — J96.01 ACUTE RESPIRATORY FAILURE WITH HYPOXIA (HCC): ICD-10-CM

## 2019-09-28 DIAGNOSIS — Z99.2 CKD (CHRONIC KIDNEY DISEASE) REQUIRING CHRONIC DIALYSIS (HCC): Primary | ICD-10-CM

## 2019-09-28 DIAGNOSIS — E87.70 HYPERVOLEMIA, UNSPECIFIED HYPERVOLEMIA TYPE: ICD-10-CM

## 2019-09-28 PROBLEM — I10 UNCONTROLLED HYPERTENSION: Status: ACTIVE | Noted: 2019-09-28

## 2019-09-28 LAB
ALBUMIN SERPL-MCNC: 3.4 G/DL (ref 3.4–5)
ALBUMIN/GLOB SERPL: 0.7 {RATIO} (ref 0.8–1.7)
ALP SERPL-CCNC: 101 U/L (ref 45–117)
ALT SERPL-CCNC: 28 U/L (ref 16–61)
ANION GAP SERPL CALC-SCNC: 7 MMOL/L (ref 3–18)
AST SERPL-CCNC: 25 U/L (ref 10–38)
BASOPHILS # BLD: 0 K/UL (ref 0–0.1)
BASOPHILS NFR BLD: 0 % (ref 0–2)
BILIRUB SERPL-MCNC: 0.4 MG/DL (ref 0.2–1)
BUN SERPL-MCNC: 66 MG/DL (ref 7–18)
BUN/CREAT SERPL: 6 (ref 12–20)
CALCIUM SERPL-MCNC: 8.6 MG/DL (ref 8.5–10.1)
CHLORIDE SERPL-SCNC: 101 MMOL/L (ref 100–111)
CO2 SERPL-SCNC: 29 MMOL/L (ref 21–32)
CREAT SERPL-MCNC: 10.4 MG/DL (ref 0.6–1.3)
DIFFERENTIAL METHOD BLD: ABNORMAL
EOSINOPHIL # BLD: 0.2 K/UL (ref 0–0.4)
EOSINOPHIL NFR BLD: 3 % (ref 0–5)
ERYTHROCYTE [DISTWIDTH] IN BLOOD BY AUTOMATED COUNT: 16.6 % (ref 11.6–14.5)
GLOBULIN SER CALC-MCNC: 4.6 G/DL (ref 2–4)
GLUCOSE SERPL-MCNC: 109 MG/DL (ref 74–99)
HBV SURFACE AG SER QL: 0.93 INDEX
HBV SURFACE AG SER QL: NEGATIVE
HCT VFR BLD AUTO: 31.2 % (ref 36–48)
HGB BLD-MCNC: 10.1 G/DL (ref 13–16)
LYMPHOCYTES # BLD: 1.1 K/UL (ref 0.9–3.6)
LYMPHOCYTES NFR BLD: 13 % (ref 21–52)
MCH RBC QN AUTO: 34.2 PG (ref 24–34)
MCHC RBC AUTO-ENTMCNC: 32.4 G/DL (ref 31–37)
MCV RBC AUTO: 105.8 FL (ref 74–97)
MONOCYTES # BLD: 1 K/UL (ref 0.05–1.2)
MONOCYTES NFR BLD: 12 % (ref 3–10)
NEUTS SEG # BLD: 5.7 K/UL (ref 1.8–8)
NEUTS SEG NFR BLD: 72 % (ref 40–73)
PLATELET # BLD AUTO: 243 K/UL (ref 135–420)
PMV BLD AUTO: 10.2 FL (ref 9.2–11.8)
POTASSIUM SERPL-SCNC: 5 MMOL/L (ref 3.5–5.5)
PROT SERPL-MCNC: 8 G/DL (ref 6.4–8.2)
RBC # BLD AUTO: 2.95 M/UL (ref 4.7–5.5)
SODIUM SERPL-SCNC: 137 MMOL/L (ref 136–145)
TROPONIN I SERPL-MCNC: 0.05 NG/ML (ref 0–0.04)
WBC # BLD AUTO: 8 K/UL (ref 4.6–13.2)

## 2019-09-28 PROCEDURE — 90935 HEMODIALYSIS ONE EVALUATION: CPT

## 2019-09-28 PROCEDURE — 99285 EMERGENCY DEPT VISIT HI MDM: CPT

## 2019-09-28 PROCEDURE — 85025 COMPLETE CBC W/AUTO DIFF WBC: CPT

## 2019-09-28 PROCEDURE — 86706 HEP B SURFACE ANTIBODY: CPT

## 2019-09-28 PROCEDURE — 84484 ASSAY OF TROPONIN QUANT: CPT

## 2019-09-28 PROCEDURE — 71046 X-RAY EXAM CHEST 2 VIEWS: CPT

## 2019-09-28 PROCEDURE — 74011000250 HC RX REV CODE- 250: Performed by: EMERGENCY MEDICINE

## 2019-09-28 PROCEDURE — 87340 HEPATITIS B SURFACE AG IA: CPT

## 2019-09-28 PROCEDURE — 80053 COMPREHEN METABOLIC PANEL: CPT

## 2019-09-28 PROCEDURE — 94640 AIRWAY INHALATION TREATMENT: CPT

## 2019-09-28 PROCEDURE — 74011250637 HC RX REV CODE- 250/637: Performed by: EMERGENCY MEDICINE

## 2019-09-28 PROCEDURE — 99218 HC RM OBSERVATION: CPT

## 2019-09-28 PROCEDURE — 93005 ELECTROCARDIOGRAM TRACING: CPT

## 2019-09-28 RX ORDER — SODIUM CHLORIDE 0.9 % (FLUSH) 0.9 %
5-40 SYRINGE (ML) INJECTION EVERY 8 HOURS
Status: DISCONTINUED | OUTPATIENT
Start: 2019-09-29 | End: 2019-09-29 | Stop reason: HOSPADM

## 2019-09-28 RX ORDER — AMLODIPINE BESYLATE 5 MG/1
5 TABLET ORAL
Status: COMPLETED | OUTPATIENT
Start: 2019-09-28 | End: 2019-09-28

## 2019-09-28 RX ORDER — ASPIRIN AND DIPYRIDAMOLE 25; 200 MG/1; MG/1
1 CAPSULE, EXTENDED RELEASE ORAL 2 TIMES DAILY
Status: DISCONTINUED | OUTPATIENT
Start: 2019-09-29 | End: 2019-09-29 | Stop reason: HOSPADM

## 2019-09-28 RX ORDER — SODIUM CHLORIDE 0.9 % (FLUSH) 0.9 %
5-40 SYRINGE (ML) INJECTION AS NEEDED
Status: DISCONTINUED | OUTPATIENT
Start: 2019-09-28 | End: 2019-09-29 | Stop reason: HOSPADM

## 2019-09-28 RX ORDER — ACETAMINOPHEN 325 MG/1
650 TABLET ORAL
Status: DISCONTINUED | OUTPATIENT
Start: 2019-09-28 | End: 2019-09-29 | Stop reason: HOSPADM

## 2019-09-28 RX ORDER — HYDRALAZINE HYDROCHLORIDE 20 MG/ML
10 INJECTION INTRAMUSCULAR; INTRAVENOUS
Status: DISCONTINUED | OUTPATIENT
Start: 2019-09-28 | End: 2019-09-29 | Stop reason: HOSPADM

## 2019-09-28 RX ORDER — ALBUTEROL SULFATE 2.5 MG/.5ML
2.5 SOLUTION RESPIRATORY (INHALATION)
Status: COMPLETED | OUTPATIENT
Start: 2019-09-28 | End: 2019-09-28

## 2019-09-28 RX ORDER — SERTRALINE HYDROCHLORIDE 50 MG/1
200 TABLET, FILM COATED ORAL DAILY
Status: DISCONTINUED | OUTPATIENT
Start: 2019-09-29 | End: 2019-09-29 | Stop reason: HOSPADM

## 2019-09-28 RX ORDER — FAMOTIDINE 20 MG/1
20 TABLET, FILM COATED ORAL DAILY
Status: DISCONTINUED | OUTPATIENT
Start: 2019-09-29 | End: 2019-09-29 | Stop reason: HOSPADM

## 2019-09-28 RX ORDER — SODIUM CHLORIDE 9 MG/ML
50 INJECTION, SOLUTION INTRAVENOUS
Status: DISCONTINUED | OUTPATIENT
Start: 2019-09-28 | End: 2019-09-29 | Stop reason: HOSPADM

## 2019-09-28 RX ORDER — ONDANSETRON 2 MG/ML
4 INJECTION INTRAMUSCULAR; INTRAVENOUS
Status: DISCONTINUED | OUTPATIENT
Start: 2019-09-28 | End: 2019-09-29 | Stop reason: HOSPADM

## 2019-09-28 RX ORDER — ATORVASTATIN CALCIUM 10 MG/1
10 TABLET, FILM COATED ORAL DAILY
Status: DISCONTINUED | OUTPATIENT
Start: 2019-09-29 | End: 2019-09-29 | Stop reason: HOSPADM

## 2019-09-28 RX ORDER — SEVELAMER CARBONATE 800 MG/1
800 TABLET, FILM COATED ORAL 3 TIMES DAILY
Status: DISCONTINUED | OUTPATIENT
Start: 2019-09-29 | End: 2019-09-29 | Stop reason: HOSPADM

## 2019-09-28 RX ORDER — TAMSULOSIN HYDROCHLORIDE 0.4 MG/1
0.4 CAPSULE ORAL DAILY
Status: DISCONTINUED | OUTPATIENT
Start: 2019-09-29 | End: 2019-09-29 | Stop reason: HOSPADM

## 2019-09-28 RX ORDER — CINACALCET 30 MG/1
30 TABLET, FILM COATED ORAL DAILY
Status: DISCONTINUED | OUTPATIENT
Start: 2019-09-29 | End: 2019-09-29 | Stop reason: HOSPADM

## 2019-09-28 RX ORDER — LANOLIN ALCOHOL/MO/W.PET/CERES
500 CREAM (GRAM) TOPICAL DAILY
Status: DISCONTINUED | OUTPATIENT
Start: 2019-09-29 | End: 2019-09-29 | Stop reason: HOSPADM

## 2019-09-28 RX ORDER — FUROSEMIDE 20 MG/1
20 TABLET ORAL DAILY
Status: DISCONTINUED | OUTPATIENT
Start: 2019-09-29 | End: 2019-09-29 | Stop reason: HOSPADM

## 2019-09-28 RX ADMIN — AMLODIPINE BESYLATE 5 MG: 5 TABLET ORAL at 21:47

## 2019-09-28 RX ADMIN — ALBUTEROL SULFATE 2.5 MG: 2.5 SOLUTION RESPIRATORY (INHALATION) at 13:40

## 2019-09-28 NOTE — DIALYSIS
JOHN        ACUTE HEMODIALYSIS FLOW SHEET      HEMODIALYSIS ORDERS: Physician:   Ji James      Dialyzer: revaclear   Duration:  2   hr  BFR:  350     DFR:  800     Dialysate:  Temp 36-37 C  K+  2        Ca+ 2.5    Na140    Bicarb 35      Weight:      kg    Patient Chart []     Unable to Obtain [x]   Dry weight/UF Goal:2000    Access avf  Needle Gauge  15   Heparin []  Bolus  Units    [] Hourly    Units    [x]  None      Catheter locking solution       Pre BP: 218/105        Pulse:     78          Respirations: 22    Temperature:   98.2   Labs: Pre     Post:      [x] N/A   Additional Orders(medications, blood products, hypotension management):  [x] N/A     [x]    John Consent Verified        CATHETER ACCESS: [x]N/A   []Right   []Left   []IJ     []Fem   []chest wall   [] First use X-ray verified     []Tunnel                [] Non Tunneled   []No S/S infection  []Redness  []Drainage []Cultured []Swelling []Pain   []Medical Aseptic Prep Utilized   []Dressing Changed  [] Biopatch  Date:    []Clotted   []Patent   Flows: []Good  []Poor  []Reversed   If access problem,  notified: []Yes    [x]N/A  Date:     GRAFT/FISTULA ACCESS:  []N/A     []Right     []Left     [x]UE     []LE   []AVG   [x]AVF        []Buttonhole    [x]Medical Aseptic Prep Utilized   [x]No S/S infection  []Redness  []Drainage []Cultured []Swelling []Pain    Bruit:   [x] Strong    [] Weak       Thrill :   [x] Strong    [] Weak       Needle Gauge:15   g    Length:1    inch   If access problem,  notified: []Yes     []N/A  Date:   Please describe access if present and not used:                            GENERAL ASSESSMENT:      LUNGS:  Rate  SaO2%     [] N/A    [x] Clear  [] Coarse  [] Crackles  [] Wheezing        [] Diminished     Location : []RLL   []LLL    []RUL  []ELDER     Cough: []Productive  []Dry  [x]N/A   Respirations:  [x]Easy  []Labored     Therapy:   [x]RA  []NC    l/min    Mask: []NRB []Venti       O2%                  []Ventilator []Intubated  [] Trach  [] BiPaP     CARDIAC: [x]Regular      [] Irregular   [] Pericardial Rub  [] JVD        []  Monitored  [] Bedside  [] Remotely monitored [] N/A  Rhythm:      EDEMA: [x] None  []Generalized  [] Pitting [] 1    [] 2    [] 3    [] 4                 [] Facial  [] Pedal  []  UE  [] LE     SKIN:   [x] Warm  [] Hot     [] Cold   [x] Dry     [] Pale   [] Diaphoretic                  [] Flushed  [] Jaundiced  [] Cyanotic  [] Rash  [] Weeping     LOC:    [x] Alert      [x]Oriented:    [x] Person     [x] Place  [x]Time               [] Confused  [] Lethargic  [] Medicated  [] Non-responsive     GI / ABDOMEN:     [] Flat    [] Distended    [x] Soft    [] Firm   []  Obese                             [] Diarrhea  [x] Bowel Sounds  [] Nausea  [] Vomiting       / URINE ASSESSMENT:   [x] Voiding   [] Oliguria  [] Anuria   []  Hebert     [] Incontinent    []  Incontinent Brief      []  Bathroom Privileges       PAIN:   [x] 0 []1  []2   []3   []4   []5   []6   []7   []8   []9   []10              Scale 0-10  Action/Follow Up:      MOBILITY:     [] Amb    [] Amb/Assist    [x] Bed    [] Wheelchair  [] Stretcher      All Vitals and Treatment Details on Attached [facility] [place]: [facility]        Room # ER02/02      [] 1st Time Acute  [x] Stat  [] Routine  [] Urgent     [x] Acute Room  []  Bedside  [] ICU/CCU  [] ER   Isolation Precautions:   There are currently no Active Isolations      Special Considerations:         [] Blood Consent Verified [x]N/A     ALLERGIES: No Known Allergies            Code Status:No Order        Hepatitis Status:    2nd RN check:                    No results found for: HAMAT, HAAB, HABT, HAAT, HBSAG, HBSB, HBSAT, HBABN, HBCM, HBCAB, HBCAT, XBCABS, HBEAB, HBEAG, XHEPCS, [id number], [address], [facility], HBCLT, HBEBLT, [id number], [id number], HAVMLT, [id number], HBCMLT, [id number], HCGAT                  Current Labs:   Lab Results   Component Value Date/Time    Sodium 137 09/28/2019 01:08 PM Potassium 5.0 09/28/2019 01:08 PM    Chloride 101 09/28/2019 01:08 PM    CO2 29 09/28/2019 01:08 PM    Anion gap 7 09/28/2019 01:08 PM    Glucose 109 (H) 09/28/2019 01:08 PM    BUN 66 (H) 09/28/2019 01:08 PM    Creatinine 10.40 (H) 09/28/2019 01:08 PM    BUN/Creatinine ratio 6 (L) 09/28/2019 01:08 PM    GFR est AA 6 (L) 09/28/2019 01:08 PM    GFR est non-AA 5 (L) 09/28/2019 01:08 PM    Calcium 8.6 09/28/2019 01:08 PM      Lab Results   Component Value Date/Time    WBC 8.0 09/28/2019 01:08 PM    Hemoglobin, POC 12.6 (L) 10/08/2010 11:50 PM    HGB 10.1 (L) 09/28/2019 01:08 PM    Hematocrit, POC 37 10/08/2010 11:50 PM    HCT 31.2 (L) 09/28/2019 01:08 PM    PLATELET 853 80/83/1519 01:08 PM    .8 (H) 09/28/2019 01:08 PM                                                                                     DIET: None       PRIMARY NURSE REPORT: First initial/Last name/Title      Pre Dialysis:  Blanca ALVES      Time: 5001         EDUCATION:       [x] Patient [] Other         Knowledge Basis: []None [x]Minimal [] Substantial   Barriers to learning     []N/A   [] Access Care     [] S&S of infection     [] Fluid Management     []K+     [x]Procedural    []Albumin     [] Medications     [] Tx Options     [] Transplant     [] Diet     [] Other   Teaching Tools:  [x] Explain  [] Demo  [] Handouts [] Video  Patient response:      [x] Verbalized understanding  [] Teach back  [] Return demonstration [] Requires follow up   Inappropriate due to            [x]    Time Out/Safety Check  [x]   Extracorporeal Circuit Tested for integrity       RO/HEMODIALYSIS MACHINE SAFETY CHECKS  Before each treatment:     Machine Number:                   [facility]                                  [] Unit Machine #     with centralized RO                                  [] Portable Machine #1/RO serial # [id number]                                  [] Portable Machine #2/RO serial # [id number]                                  [] Portable Machine #4/RO serial # [id number]                                                     [facility]                                  [x] Portable Machine #1/RO serial # [id number]                                   [] Portable Machine #2/RO serial # [id number]                                  [] Portable Machine #3/RO serial #  [id number]      Alarm Test:  Pass time                [x]RO/Machine Log Complete      Temp 36               Dialysate: pH7.2  Conductivity: Meter 14       HD Machine    14           TCD: 13.9  Dialyzer Lot #  [id number]          Blood Tubing Lot #  [id number]          Saline Lot #  [id number]        CHLORINE TESTING-Before each treatment and every 4 hours    Total Chlorine:    [x] less than 0.1 ppm  Time: 530pm     4 Hr/2nd Check Time:       (if greater than 0.1 ppm from Primary then every 30 minutes from Secondary)     TREATMENT INITIATION  with Dialysis Precautions:   [x] All Connections Secured                 [x] Saline Line Double Clamped   [x] Venous Parameters Set                  [x] Arterial Parameters Set    [x] Prime Given 250ml                          [x]Air Foam Detector Engaged      Treatment Initiation Note: started treatment using left arm fistula. Pt. In no distress. During Treatment Notes:              Medication Dose Volume Route Time [facility] name Title         RN         RN         RN                     RN                   Post Assessment:   Dialyzer Cleared:    [x] Good [] Fair  [] Poor  Blood processed:      L  UF Removed  2000    Ml  POst BP:  199/96           Pulse: 85           Respirations:20      Temperature:     Lungs:     [x] Clear      [] Course         [] Crackles    [] Wheezing         [] Diminished   Post Tx Vascular Access:   AVF/AVG: Bleeding stopped   Art  6   min. Hira. 6    Min   N/A    Cardiac:   [x] Regular   [] Irregular   [] Monitor  [] N/A      Rhythm:       Catheter:   Locking solution: Heparin 1:1000   Art. Hira.      N/A    Skin:  Pain:    [x] Warm  [x] Dry [] Diaphoretic    [] Flushed    [] Pale [] Cyanotic [x]0  []1  []2   []3  []4   []5   []6   []7   []8   []9   []10     Post Treatment Note: Pt. Tolerated treatment well.          POST TREATMENT PRIMARY NURSE HANDOFF REPORT:     First initial/Last name/Title         Post Dialysis: ER charge RN    Time:  2030     Abbreviations: AVG-arterial venous graft, AVF-arterial venous fistula, IJ-Internal Jugular, Subcl-Subclavian, Fem-Femoral, Tx-treatment, AP/HR-apical heart rate, DFR-dialysate flow rate, BFR-blood flow rate, AP-arterial pressure, -venous pressure, UF-ultrafiltrate, TMP-transmembrane pressure, Hira-Venous, Art-Arterial, RO-Reverse Osmosis

## 2019-09-28 NOTE — ED PROVIDER NOTES
EMERGENCY DEPARTMENT HISTORY AND PHYSICAL EXAM    1:13 PM      Date: 9/28/2019  Patient Name: Delmer Gregory    History of Presenting Illness     Chief Complaint   Patient presents with    Shortness of Breath    Hypertension         History Provided By: patient    Additional History (Context): Delmer Gregory is a 78 y.o. male presents with shortness of breath that started yesterday after dinner. He is dialysis Monday Wednesday Friday, last dialysis 3 days ago because he had something to do yesterday so did not go to dialysis. Symptoms are mild at rest, they really come on when he tries to ambulate. He has a cough and some rattling in his chest.  No fever nausea vomiting abdominal pain or chest pain. No known lung disease COPD or asthma. No known cardiac disease MIs or CHF. Nephrologist Dr. Agnieszka Mares    .    PCP: Cayetano Jang MD    Chief Complaint:   Duration:    Timing:    Location:   Quality:   Severity:   Modifying Factors:   Associated Symptoms:       Current Facility-Administered Medications   Medication Dose Route Frequency Provider Last Rate Last Dose    albuterol CONCENTRATE 2.5mg/0.5 mL neb soln  2.5 mg Nebulization NOW Eliane Gayle MD         Current Outpatient Medications   Medication Sig Dispense Refill    amLODIPine (NORVASC) 5 mg tablet TAKE 1 TABLET BY MOUTH EVERY DAY 90 Tab 1    atorvastatin (LIPITOR) 10 mg tablet Take 1 Tab by mouth daily. 30 Tab 2    aspirin-dipyridamole (AGGRENOX)  mg per SR capsule TAKE 1 CAPSULE BY MOUTH TWICE A  Cap 1    sertraline (ZOLOFT) 100 mg tablet TAKE 2 TABLETS BY MOUTH EVERY  Tab 1    furosemide (LASIX) 20 mg tablet Take 1 Tab by mouth daily. 30 Tab 2    cyanocobalamin (VITAMIN B-12) 500 mcg tablet Take 500 mcg by mouth daily.  tamsulosin (FLOMAX) 0.4 mg capsule TAKE 1 CAPSULE BY MOUTH EVERY DAY 1/2 HOUR FOLLOWING THE SAME MEAL EACH DAY  3    cinacalcet (SENSIPAR) 30 mg tablet Take 30 mg by mouth daily.       sevelamer carbonate (RENVELA) 800 mg tab tab Take 800 mg by mouth three (3) times daily. Past History     Past Medical History:  Past Medical History:   Diagnosis Date    Arthritis     [facility] [address]     Chronic kidney disease     dialysis    Community acquired pneumonia     Decreased exercise tolerance     Depression     Dialysis patient ([facility] [address]     Difficulty urinating     Dizziness     Hypercholesteremia     Hypercholesterolemia     Hypertension     Other malignant neoplasm without specification of site     Prostate CA ([facility] [address] 2007    s/p combined radiation therapy    Prostate cancer ([facility] [address]     Renal failure     Stroke ([facility] [address]     Urinary frequency        Past Surgical History:  Past Surgical History:   Procedure Laterality Date    HX COLONOSCOPY  2015    HX HEENT  cataract sx    HX HEENT      Cataract surgery 2002    HX OTHER SURGICAL  09/10/2014    DIALYSIS FISTULA OR GRAFT    HX OTHER SURGICAL  07/20/2015    VENOUS ACCESS CATHETER INSERTION    HX OTHER SURGICAL  11/03/2016    VENOUS ACCESS CATHETER INSERTION    HX OTHER SURGICAL  11/04/2016    ARTERIOVENOUS FISTULA REVISION    HX UROLOGICAL  2007    INSERTION RADIOACTIVE SEEDS       Family History:  History reviewed. No pertinent family history. Social History:  Social History     Tobacco Use    Smoking status: Never Smoker    Smokeless tobacco: Never Used   Substance Use Topics    Alcohol use: Yes     Alcohol/week: 8.0 standard drinks     Types: 1 Cans of beer, 5 Shots of liquor per week     Comment: occasional    Drug use: No       Allergies:  No Known Allergies      Review of Systems     Review of Systems   Constitutional: Negative for diaphoresis and fever. HENT: Negative for congestion and sore throat. Eyes: Negative for pain and itching. Respiratory: Positive for cough and shortness of breath. Cardiovascular: Negative for chest pain and palpitations. Gastrointestinal: Negative for abdominal pain and diarrhea. Endocrine: Negative for polydipsia and polyuria. Genitourinary: Negative for dysuria and hematuria. Musculoskeletal: Negative for arthralgias and myalgias. Skin: Negative for rash and wound. Neurological: Negative for seizures and syncope. Hematological: Does not bruise/bleed easily. Psychiatric/Behavioral: Negative for agitation and hallucinations. Physical Exam       Patient Vitals for the past 12 hrs:   Temp Pulse Resp BP SpO2   09/28/19 1222 98.6 °F (37 °C) 81 16 (!) 217/102 90 %       Physical Exam   Constitutional: He appears well-developed and well-nourished. HENT:   Head: Normocephalic and atraumatic. Eyes: Conjunctivae are normal. No scleral icterus. Neck: Normal range of motion. Neck supple. JVD (Slight) present. Cardiovascular: Normal rate, regular rhythm and normal heart sounds. 4 intact extremity pulses   Pulmonary/Chest: Effort normal. He has wheezes. Abdominal: Soft. He exhibits no mass. There is no tenderness. Musculoskeletal: Normal range of motion. Left arm fistula with good palpable thrill otherwise appears benign. Lymphadenopathy:     He has no cervical adenopathy. Neurological: He is alert. Skin: Skin is warm and dry. Nursing note and vitals reviewed. Diagnostic Study Results   Labs -  No results found for this or any previous visit (from the past 12 hour(s)). Radiologic Studies -   XR CHEST PA LAT    (Results Pending)     No results found. Medications ordered:   Medications   albuterol CONCENTRATE 2.5mg/0.5 mL neb soln (has no administration in time range)         Medical Decision Making   Initial Medical Decision Making and DDx:     Bronchitis pneumonia fluid overload combination of the above. Doubt ACS CHF.     ED Course: Progress Notes, Reevaluation, and Consults:  ED Course as of Sep 30 0901   Sat Sep 28, 2019   1426 The patient has had a little bit of improvement with the breathing treatment, pulse ox on room air is 92% independently viewed the chest x-ray films and consistent with pulmonary edema. Will call his nephrologist and get him dialyzed. [CB]   3519 Sat momentarily drops to 84% on room air.    [CB]   1520 Paging again for Dr Heidi Valera    [CB]   [phone] Still waiting on callback from nephrologist.  Patient remained stable. Nursing supervisor involved. [CB]      ED Course User Index  [CB] Brandi Joseph MD         I am the first provider for this patient. I reviewed the vital signs, available nursing notes, past medical history, past surgical history, family history and social history. Patient Vitals for the past 12 hrs:   Temp Pulse Resp BP SpO2   09/28/19 1222 98.6 °F (37 °C) 81 16 (!) 217/102 90 %       Vital Signs-Reviewed the patient's vital signs. Pulse Oximetry Analysis, Cardiac Monitor, 12 lead ekg:     EKG at 1319, sinus rhythm at 73 no acute process. Telemetry sinus rhythm at 81 no acute process. Oximetry  Interpreted by the EP. Critical care time 35 minutes for repeated assessments, treatment, analysis of data, coordination of care to get the patient dialyzed. System at risk respiratory. Records Reviewed: Nursing notes reviewed (Time of Review: 1:13 PM)    Procedures:   Critical Care Time:   Aspirin: (was aspirin given for stroke?)    Diagnosis     Clinical Impression:   1. CKD (chronic kidney disease) requiring chronic dialysis ([facility] [address]    2. Hypervolemia, unspecified hypervolemia type    3. Acute respiratory failure with hypoxia (HCC)        Disposition:       Follow-up Information     Follow up With Specialties Details Why Contact Amanda Dupont MD Internal Medicine   [facility]  Alli Abdalla [address] [place] [address]  [phone]        In 1 week      Dara Ash MD Internal Medicine  Follow-up within one week for recent hospitalization.  [facility]  [address]  [phone]             Patient's Medications   Start Taking    No medications on file Continue Taking    AMLODIPINE (NORVASC) 5 MG TABLET    TAKE 1 TABLET BY MOUTH EVERY DAY    ASPIRIN-DIPYRIDAMOLE (AGGRENOX)  MG PER SR CAPSULE    TAKE 1 CAPSULE BY MOUTH TWICE A DAY    ATORVASTATIN (LIPITOR) 10 MG TABLET    Take 1 Tab by mouth daily. CINACALCET (SENSIPAR) 30 MG TABLET    Take 30 mg by mouth daily. CYANOCOBALAMIN (VITAMIN B-12) 500 MCG TABLET    Take 500 mcg by mouth daily. FUROSEMIDE (LASIX) 20 MG TABLET    Take 1 Tab by mouth daily. SERTRALINE (ZOLOFT) 100 MG TABLET    TAKE 2 TABLETS BY MOUTH EVERY DAY    SEVELAMER CARBONATE (RENVELA) 800 MG TAB TAB    Take 800 mg by mouth three (3) times daily.     TAMSULOSIN (FLOMAX) 0.4 MG CAPSULE    TAKE 1 CAPSULE BY MOUTH EVERY DAY 1/2 HOUR FOLLOWING THE SAME MEAL EACH DAY   These Medications have changed    No medications on file   Stop Taking    No medications on file     _______________________________    Notes:    Dori Silverman MD using Dragon dictation      _______________________________

## 2019-09-28 NOTE — ED NOTES
I performed a brief history of the patient here in triage and I have determined that pt will need further treatment and evaluation from the main side ER physician. I have placed initial orders based on the history to help in expediting patients care.        Visit Vitals  BP (!) 217/102   Temp 98.6 °F (37 °C)   Resp 16   SpO2 90%      SAEMER Madden 12:24 PM

## 2019-09-29 VITALS
RESPIRATION RATE: 18 BRPM | HEIGHT: 68 IN | HEART RATE: 77 BPM | OXYGEN SATURATION: 94 % | TEMPERATURE: 98.9 F | BODY MASS INDEX: 28.19 KG/M2 | SYSTOLIC BLOOD PRESSURE: 162 MMHG | WEIGHT: 186 LBS | DIASTOLIC BLOOD PRESSURE: 79 MMHG

## 2019-09-29 LAB
ATRIAL RATE: 73 BPM
CALCULATED P AXIS, ECG09: 48 DEGREES
CALCULATED R AXIS, ECG10: 7 DEGREES
CALCULATED T AXIS, ECG11: 50 DEGREES
DIAGNOSIS, 93000: NORMAL
P-R INTERVAL, ECG05: 252 MS
Q-T INTERVAL, ECG07: 392 MS
QRS DURATION, ECG06: 74 MS
QTC CALCULATION (BEZET), ECG08: 431 MS
VENTRICULAR RATE, ECG03: 73 BPM

## 2019-09-29 PROCEDURE — 99218 HC RM OBSERVATION: CPT

## 2019-09-29 PROCEDURE — 74011250637 HC RX REV CODE- 250/637: Performed by: HOSPITALIST

## 2019-09-29 PROCEDURE — 77010033678 HC OXYGEN DAILY

## 2019-09-29 PROCEDURE — 74011250637 HC RX REV CODE- 250/637: Performed by: FAMILY MEDICINE

## 2019-09-29 RX ORDER — AMLODIPINE BESYLATE 10 MG/1
10 TABLET ORAL DAILY
Qty: 30 TAB | Refills: 0 | Status: SHIPPED | OUTPATIENT
Start: 2019-09-29

## 2019-09-29 RX ORDER — AMLODIPINE BESYLATE 10 MG/1
10 TABLET ORAL DAILY
Status: DISCONTINUED | OUTPATIENT
Start: 2019-09-29 | End: 2019-09-29 | Stop reason: HOSPADM

## 2019-09-29 RX ADMIN — TAMSULOSIN HYDROCHLORIDE 0.4 MG: 0.4 CAPSULE ORAL at 08:48

## 2019-09-29 RX ADMIN — ASPIRIN AND DIPYRIDAMOLE 1 CAPSULE: 25; 200 CAPSULE, EXTENDED RELEASE ORAL at 08:48

## 2019-09-29 RX ADMIN — SERTRALINE HYDROCHLORIDE 200 MG: 50 TABLET ORAL at 08:49

## 2019-09-29 RX ADMIN — Medication 10 ML: at 06:10

## 2019-09-29 RX ADMIN — ATORVASTATIN CALCIUM 10 MG: 10 TABLET, FILM COATED ORAL at 08:48

## 2019-09-29 RX ADMIN — AMLODIPINE BESYLATE 10 MG: 10 TABLET ORAL at 09:47

## 2019-09-29 RX ADMIN — CYANOCOBALAMIN TAB 1000 MCG 500 MCG: 1000 TAB at 08:47

## 2019-09-29 RX ADMIN — CINACALCET HYDROCHLORIDE 30 MG: 30 TABLET, COATED ORAL at 08:48

## 2019-09-29 RX ADMIN — FUROSEMIDE 20 MG: 20 TABLET ORAL at 08:48

## 2019-09-29 RX ADMIN — SEVELAMER CARBONATE 800 MG: 800 TABLET, FILM COATED ORAL at 08:48

## 2019-09-29 RX ADMIN — Medication 10 ML: at 00:00

## 2019-09-29 RX ADMIN — FAMOTIDINE 20 MG: 20 TABLET ORAL at 08:48

## 2019-09-29 NOTE — DISCHARGE INSTRUCTIONS
Patient Education        Home Blood Pressure Test: About This Test  What is it? A home blood pressure test allows you to keep track of your blood pressure at home. Blood pressure is a measure of the force of blood against the walls of your arteries. Blood pressure readings include two numbers, such as 130/80 (say \"130 over 80\"). The first number is the systolic pressure. The second number is the diastolic pressure. Why is this test done? You may do this test at home to:  · Find out if you have high blood pressure. · Track your blood pressure if you have high blood pressure. · Track how well medicine is working to reduce high blood pressure. · Check how lifestyle changes, such as weight loss and exercise, are affecting blood pressure. How can you prepare for the test?  · Do not use caffeine, tobacco, or medicines known to raise blood pressure (such as nasal decongestant sprays) for at least 30 minutes before taking your blood pressure. · Do not exercise for at least 30 minutes before taking your blood pressure. What happens before the test?  Take your blood pressure while you feel comfortable and relaxed. Sit quietly with both feet on the floor for at least 5 minutes before the test.  What happens during the test?  · Sit with your arm slightly bent and resting on a table so that your upper arm is at the same level as your heart. · Roll up your sleeve or take off your shirt to expose your upper arm. · Wrap the blood pressure cuff around your upper arm so that the lower edge of the cuff is about 1 inch above the bend of your elbow. Proceed with the following steps depending on if you are using an automatic or manual pressure monitor. Automatic blood pressure monitors  · Press the on/off button on the automatic monitor and wait until the ready-to-measure \"heart\" symbol appears next to zero in the display window. · Press the start button. The cuff will inflate and deflate by itself.   · Your blood pressure numbers will appear on the screen. · Write your numbers in your log book, along with the date and time. Manual blood pressure monitors  · Place the earpieces of a stethoscope in your ears, and place the bell of the stethoscope over the artery, just below the cuff. · Close the valve on the rubber inflating bulb. · Squeeze the bulb rapidly with your opposite hand to inflate the cuff until the dial or column of mercury reads about 30 mm Hg higher than your usual systolic pressure. If you do not know your usual pressure, inflate the cuff to 210 mm Hg or until the pulse at your wrist disappears. · Open the pressure valve just slightly by twisting or pressing the valve on the bulb. · As you watch the pressure slowly fall, note the level on the dial at which you first start to hear a pulsing or tapping sound through the stethoscope. This is your systolic blood pressure. · Continue letting the air out slowly. The sounds will become muffled and will finally disappear. Note the pressure when the sounds completely disappear. This is your diastolic blood pressure. Let out all the remaining air. · Write your numbers in your log book, along with the date and time. What else should you know about the test?  It is more accurate to take the average of several readings made throughout the day than to rely on a single reading. It's normal for blood pressure to go up and down throughout the day. Follow-up care is a key part of your treatment and safety. Be sure to make and go to all appointments, and call your doctor if you are having problems. It's also a good idea to keep a list of the medicines you take. Where can you learn more? Go to http://lizy-maylin.info/. Enter C427 in the search box to learn more about \"Home Blood Pressure Test: About This Test.\"  Current as of: April 9, 2019  Content Version: 12.2  © 5439-4879 [employer].  Care instructions adapted under license by [facility] (which disclaims liability or warranty for this information). If you have questions about a medical condition or this instruction, always ask your healthcare professional. [address] any warranty or liability for your use of this information. Patient Education        High Blood Pressure: Care Instructions  Overview    It's normal for blood pressure to go up and down throughout the day. But if it stays up, you have high blood pressure. Another name for high blood pressure is hypertension. Despite what a lot of people think, high blood pressure usually doesn't cause headaches or make you feel dizzy or lightheaded. It usually has no symptoms. But it does increase your risk of stroke, heart attack, and other problems. You and your doctor will talk about your risks of these problems based on your blood pressure. Your doctor will give you a goal for your blood pressure. Your goal will be based on your health and your age. Lifestyle changes, such as eating healthy and being active, are always important to help lower blood pressure. You might also take medicine to reach your blood pressure goal.  Follow-up care is a key part of your treatment and safety. Be sure to make and go to all appointments, and call your doctor if you are having problems. It's also a good idea to know your test results and keep a list of the medicines you take. How can you care for yourself at home? Medical treatment  · If you stop taking your medicine, your blood pressure will go back up. You may take one or more types of medicine to lower your blood pressure. Be safe with medicines. Take your medicine exactly as prescribed. Call your doctor if you think you are having a problem with your medicine. · Talk to your doctor before you start taking aspirin every day. Aspirin can help certain people lower their risk of a heart attack or stroke.  But taking aspirin isn't right for everyone, because it can cause serious bleeding. · See your doctor regularly. You may need to see the doctor more often at first or until your blood pressure comes down. · If you are taking blood pressure medicine, talk to your doctor before you take decongestants or anti-inflammatory medicine, such as ibuprofen. Some of these medicines can raise blood pressure. · Learn how to check your blood pressure at home. Lifestyle changes  · Stay at a healthy weight. This is especially important if you put on weight around the waist. Losing even 10 pounds can help you lower your blood pressure. · If your doctor recommends it, get more exercise. Walking is a good choice. Bit by bit, increase the amount you walk every day. Try for at least 30 minutes on most days of the week. You also may want to swim, bike, or do other activities. · Avoid or limit alcohol. Talk to your doctor about whether you can drink any alcohol. · Try to limit how much sodium you eat to less than 2,300 milligrams (mg) a day. Your doctor may ask you to try to eat less than 1,500 mg a day. · Eat plenty of fruits (such as bananas and oranges), vegetables, legumes, whole grains, and low-fat dairy products. · Lower the amount of saturated fat in your diet. Saturated fat is found in animal products such as milk, cheese, and meat. Limiting these foods may help you lose weight and also lower your risk for heart disease. · Do not smoke. Smoking increases your risk for heart attack and stroke. If you need help quitting, talk to your doctor about stop-smoking programs and medicines. These can increase your chances of quitting for good. When should you call for help? Call  911 anytime you think you may need emergency care. This may mean having symptoms that suggest that your blood pressure is causing a serious heart or blood vessel problem. Your blood pressure may be over 180/120.   For example, call  911 if:    · You have symptoms of a heart attack. These may include:  ?  Chest pain or pressure, or a strange feeling in the chest.  ? Sweating. ? Shortness of breath. ? Nausea or vomiting. ? Pain, pressure, or a strange feeling in the back, neck, jaw, or upper belly or in one or both shoulders or arms. ? Lightheadedness or sudden weakness. ? A fast or irregular heartbeat.     · You have symptoms of a stroke. These may include:  ? Sudden numbness, tingling, weakness, or loss of movement in your face, arm, or leg, especially on only one side of your body. ? Sudden vision changes. ? Sudden trouble speaking. ? Sudden confusion or trouble understanding simple statements. ? Sudden problems with walking or balance. ? A sudden, severe headache that is different from past headaches.     · You have severe back or belly pain.    Do not wait until your blood pressure comes down on its own. Get help right away.   Call your doctor now or seek immediate care if:    · Your blood pressure is much higher than normal (such as 180/120 or higher), but you don't have symptoms.     · You think high blood pressure is causing symptoms, such as:  ? Severe headache.  ? Blurry vision.    Watch closely for changes in your health, and be sure to contact your doctor if:    · Your blood pressure measures higher than your doctor recommends at least 2 times. That means the top number is higher or the bottom number is higher, or both.     · You think you may be having side effects from your blood pressure medicine. Where can you learn more? Go to http://lizy-maylin.info/. Enter U700 in the search box to learn more about \"High Blood Pressure: Care Instructions. \"  Current as of: April 9, 2019  Content Version: 12.2  © 3829-4487 [employer]. Care instructions adapted under license by [employer] (which disclaims liability or warranty for this information).  If you have questions about a medical condition or this instruction, always ask your healthcare professional. [employer], [facility] disclaims any warranty or liability for your use of this information. As part of the discharge instructions, medications already given today were discussed with the patient. The next dose due of all ordered meds was highlighted as part of the medication discharge instructions. Discussed with the patient the importance of taking medications as directed, as well as the side effects and adverse reactions to medications ordered. DISCHARGE SUMMARY from Nurse    PATIENT INSTRUCTIONS:    After general anesthesia or intravenous sedation, for 24 hours or while taking prescription Narcotics:  · Limit your activities  · Do not drive and operate hazardous machinery  · Do not make important personal or business decisions  · Do  not drink alcoholic beverages  · If you have not urinated within 8 hours after discharge, please contact your surgeon on call. Report the following to your surgeon:  · Excessive pain, swelling, redness or odor of or around the surgical area  · Temperature over 100.5  · Nausea and vomiting lasting longer than 4 hours or if unable to take medications  · Any signs of decreased circulation or nerve impairment to extremity: change in color, persistent  numbness, tingling, coldness or increase pain  · Any questions    What to do at Home:  Recommended activity: Activity as tolerated,     If you experience any of the following symptoms uncontrolled hypertension, chest pains, shortness of breath, dizziness, temperature greater than 100.5, increased weakness, nausea, vomiting, diarrhea, severe pains and , please follow up with PCP or call 911. *  Please give a list of your current medications to your Primary Care Provider. *  Please update this list whenever your medications are discontinued, doses are      changed, or new medications (including over-the-counter products) are added.     *  Please carry medication information at all times in case of emergency situations. These are general instructions for a healthy lifestyle:    No smoking/ No tobacco products/ Avoid exposure to second hand smoke  Surgeon General's Warning:  Quitting smoking now greatly reduces serious risk to your health. Obesity, smoking, and sedentary lifestyle greatly increases your risk for illness    A healthy diet, regular physical exercise & weight monitoring are important for maintaining a healthy lifestyle    You may be retaining fluid if you have a history of heart failure or if you experience any of the following symptoms:  Weight gain of 3 pounds or more overnight or 5 pounds in a week, increased swelling in our hands or feet or shortness of breath while lying flat in bed. Please call your doctor as soon as you notice any of these symptoms; do not wait until your next office visit. The discharge information has been reviewed with the patient. The patient verbalized understanding. Discharge medications reviewed with the patient and appropriate educational materials and side effects teaching were provided. ___________________________________________________________________________________________________________________________________    MyChart Activation    Thank you for requesting access to [employer]. Please follow the instructions below to securely access and download your online medical record. [employer] allows you to send messages to your doctor, view your test results, renew your prescriptions, schedule appointments, and more. How Do I Sign Up? 1. In your internet browser, go to [email or web address]  2. Click on the First Time User? Click Here link in the Sign In box. You will be redirect to the New Member Sign Up page. 3. Enter your [employer] Access Code exactly as it appears below. You will not need to use this code after youve completed the sign-up process. If you do not sign up before the expiration date, you must request a new code.     [employer] Access Code: [email or web address]  Expires: 2019 12:19 PM (This is the date your [employer] access code will )    4. Enter the last four digits of your Social Security Number (xxxx) and Date of Birth (mm/dd/yyyy) as indicated and click Submit. You will be taken to the next sign-up page. 5. Create a [employer] ID. This will be your [employer] login ID and cannot be changed, so think of one that is secure and easy to remember. 6. Create a [employer] password. You can change your password at any time. 7. Enter your Password Reset Question and Answer. This can be used at a later time if you forget your password. 8. Enter your e-mail address. You will receive e-mail notification when new information is available in [address] 9. Click Sign Up. You can now view and download portions of your medical record. 10. Click the Download Summary menu link to download a portable copy of your medical information. Additional Information    If you have questions, please visit the Frequently Asked Questions section of the [employer] website at [email or web address] Remember, [employer] is NOT to be used for urgent needs. For medical emergencies, dial 911.     Patient armband removed and shredded

## 2019-09-29 NOTE — PROGRESS NOTES
Problem: Discharge Planning  Goal: *Discharge to safe environment  Outcome: Resolved/Met  Plan is home

## 2019-09-29 NOTE — PROGRESS NOTES
Patient discharge to home per MD orders. Patient giving discharge instructions, follow-up appointment, and prescription. Patient stated understanding of discharge instructions, follow-up appointment within one week with Dr. Ayaz Rodriguez and medication administration. In addition to the new prescription of Norvasc 10 mg daily for hypertension. All questions answered. Will continue to monitor. [phone]  Patient transported to the exit via wheelchair accompanied by staff and family member. No acute distress noted at this time.

## 2019-09-29 NOTE — PROGRESS NOTES
Bedside shift change report given to this RN (oncoming nurse) by Peri Murguia RN (offgoing nurse). Report included the following information SBAR, Kardex and Cardiac Rhythm A-flutter. Received patient in bed asleep resting quietly with head of the bed elevated. Call bell within reach, bed in the lowest position. Patient has a left AV fistula positive for Bruit and Thrill. No acute distress noted. Will continue to monitor.

## 2019-09-29 NOTE — ED NOTES
Pt back from dialysis, ambulatory to the bathroom, NAD Pt has no complaints, MD aware of the Hypertension

## 2019-09-29 NOTE — H&P
Internal Medicine History and Physical          Subjective     HPI: Abril Faustin is a 78 y.o. male who presented to the ED with shortness of breath that started yesterday after dinner. He is dialysis Monday Wednesday Friday, last dialysis 3 days ago because he had something to do yesterday so did not go to dialysis. Symptoms are mild at rest, they really come on when he tries to ambulate. He has a cough and some rattling in his chest.  No fever nausea vomiting abdominal pain or chest pain. No known lung disease COPD or asthma. No known cardiac disease MIs or CHF. ED evaluation revealed pulmonary congestion, he was dialysed from the ED. Upon return from dialysis he was hypertensive with SBP>200.      Will admit for further evaluation and treatment      PMHx:  Past Medical History:   Diagnosis Date    Arthritis     Cancer ([facility]     Chronic kidney disease     dialysis    Community acquired pneumonia     Decreased exercise tolerance     Depression     Dialysis patient ([facility]     Difficulty urinating     Dizziness     Hypercholesteremia     Hypercholesterolemia     Hypertension     Other malignant neoplasm without specification of site     Prostate CA ([facility] [address] [facility] 2007    s/p combined radiation therapy    Prostate cancer ([facility]     Renal failure     Stroke ([facility]     Urinary frequency        PSurgHx:  Past Surgical History:   Procedure Laterality Date    HX COLONOSCOPY  2015    HX HEENT  cataract sx    HX HEENT      Cataract surgery 2002    HX OTHER SURGICAL  09/10/2014    DIALYSIS FISTULA OR GRAFT    HX OTHER SURGICAL  07/20/2015    VENOUS ACCESS CATHETER INSERTION    HX OTHER SURGICAL  11/03/2016    VENOUS ACCESS CATHETER INSERTION    HX OTHER SURGICAL  11/04/2016    ARTERIOVENOUS FISTULA REVISION    HX UROLOGICAL  2007    INSERTION RADIOACTIVE SEEDS       SocialHx:  Social History     Socioeconomic History    Marital status:      Spouse name: Not on file    Number of children: Not on file    Years of education: Not on file    Highest education level: Not on file   Occupational History    Not on file   Social Needs    Financial resource strain: Not on file    Food insecurity:     Worry: Not on file     Inability: Not on file    Transportation needs:     Medical: Not on file     Non-medical: Not on file   Tobacco Use    Smoking status: Never Smoker    Smokeless tobacco: Never Used   Substance and Sexual Activity    Alcohol use: Yes     Alcohol/week: 8.0 standard drinks     Types: 1 Cans of beer, 5 Shots of liquor per week     Comment: occasional    Drug use: No    Sexual activity: Never     Partners: Female     Comment: ZBIGNIEW NGUYỄN CAP/XRT   Lifestyle    Physical activity:     Days per week: Not on file     Minutes per session: Not on file    Stress: Not on file   Relationships    Social connections:     Talks on phone: Not on file     Gets together: Not on file     Attends Zoroastrianism service: Not on file     Active member of club or organization: Not on file     Attends meetings of clubs or organizations: Not on file     Relationship status: Not on file    Intimate partner violence:     Fear of current or ex partner: Not on file     Emotionally abused: Not on file     Physically abused: Not on file     Forced sexual activity: Not on file   Other Topics Concern    Not on file   Social History Narrative    ** Merged History Encounter **            Allergies:  No Known Allergies    FamilyHx:  History reviewed. No pertinent family history. Prior to Admission Medications   Prescriptions Last Dose Informant Patient Reported? Taking? amLODIPine (NORVASC) 5 mg tablet   No No   Sig: TAKE 1 TABLET BY MOUTH EVERY DAY   aspirin-dipyridamole (AGGRENOX)  mg per SR capsule   No No   Sig: TAKE 1 CAPSULE BY MOUTH TWICE A DAY   atorvastatin (LIPITOR) 10 mg tablet   No No   Sig: Take 1 Tab by mouth daily. cinacalcet (SENSIPAR) 30 mg tablet   Yes No   Sig: Take 30 mg by mouth daily. cyanocobalamin (VITAMIN B-12) 500 mcg tablet   Yes No   Sig: Take 500 mcg by mouth daily. furosemide (LASIX) 20 mg tablet   No No   Sig: Take 1 Tab by mouth daily. sertraline (ZOLOFT) 100 mg tablet   No No   Sig: TAKE 2 TABLETS BY MOUTH EVERY DAY   sevelamer carbonate (RENVELA) 800 mg tab tab   Yes No   Sig: Take 800 mg by mouth three (3) times daily.    tamsulosin (FLOMAX) 0.4 mg capsule   Yes No   Sig: TAKE 1 CAPSULE BY MOUTH EVERY DAY 1/2 HOUR FOLLOWING THE SAME MEAL EACH DAY      Facility-Administered Medications: None       Review of Systems:  CONST: fever(-),   chills(-),   fatigue(-),   malaise(-)  SKIN: itching(-),   rash(-)  EYES: vision - no change from baseline  ENT: ear pain(-),   nasal discharge(-),   sore throat(-),   voice change(-)  RESP: SOB(+) improved after dialysis,   cough(-),   hemoptysis(-)  CV: chest pain(-),   PND(-),   orthopnea(-),   exertional dyspnea(-),   palpitations(-), syncope(-)  GI: abd pain(-),   nausea(-),   vomiting(-),   diarrhea(-),   melena(-),   hematemesis(-), hematochesia(-)  : dysuria(-),   frequency(-),   urgency(-),   hematuria(-)  MS: arthralgias(-),   myalgias(-)  NEURO: speech w/o change,   tremors(-),   seizures(-),   numbness(-),   dizziness(-)    Objective      Visit Vitals  BP (!) 221/95   Pulse 75   Temp 99.6 °F (37.6 °C)   Resp 18   SpO2 92%       Physical Exam:  CONST: NAD,   VSS reviewed  SKIN: rashes(-),   ulcers(-)  EYES: PERRL,   EOMI,   sclerae w/o jaundice  HENT: HEENT,   normal appearing nose and ears,   normal nasal mucosa and turbinates  NECK: supple,   no LA,   trachea is midline,   thyroid w/o goiter or palpable nodules  RESP: normal respiratory effort,   wheezes(-),   rales(-),   rhochi(-),   no consolidation on percussion  CHEST: normal axillae,   retractions(-),   use of accessory muscles(-)  CV: JVD(-),   carotid bruits(-),   RRR,   gallops(-),   murmurs(-),   edema LE(-),   abd bruits(-),   peripheral pulses normal  ABD: soft, tender(-), distended(-),   HSM(-),   BS(+) in all quadrants,   peritoneal signs(-)  MS: NROM in all extremities,  clubbing(-),   peripheral cyanosis(-)  NEURO: speech normal, tremors(-),   CN II-XII(-),   lateralizing signs(-)  PSYCH: AOC x 3,   appropriate affect,   non-suicidal      Laboratory Studies:  CMP:   Lab Results   Component Value Date/Time     09/28/2019 01:08 PM    K 5.0 09/28/2019 01:08 PM     09/28/2019 01:08 PM    CO2 29 09/28/2019 01:08 PM    AGAP 7 09/28/2019 01:08 PM     (H) 09/28/2019 01:08 PM    BUN 66 (H) 09/28/2019 01:08 PM    CREA 10.40 (H) 09/28/2019 01:08 PM    GFRAA 6 (L) 09/28/2019 01:08 PM    GFRNA 5 (L) 09/28/2019 01:08 PM    CA 8.6 09/28/2019 01:08 PM    ALB 3.4 09/28/2019 01:08 PM    TP 8.0 09/28/2019 01:08 PM    GLOB 4.6 (H) 09/28/2019 01:08 PM    AGRAT 0.7 (L) 09/28/2019 01:08 PM    SGOT 25 09/28/2019 01:08 PM    ALT 28 09/28/2019 01:08 PM     CBC:   Lab Results   Component Value Date/Time    WBC 8.0 09/28/2019 01:08 PM    HGB 10.1 (L) 09/28/2019 01:08 PM    HCT 31.2 (L) 09/28/2019 01:08 PM     09/28/2019 01:08 PM     All Cardiac Markers in the last 24 hours:   Lab Results   Component Value Date/Time    TROIQ 0.05 (H) 09/28/2019 01:08 PM     Liver Panel:   Lab Results   Component Value Date/Time    ALB 3.4 09/28/2019 01:08 PM    TP 8.0 09/28/2019 01:08 PM    GLOB 4.6 (H) 09/28/2019 01:08 PM    AGRAT 0.7 (L) 09/28/2019 01:08 PM    SGOT 25 09/28/2019 01:08 PM    ALT 28 09/28/2019 01:08 PM     09/28/2019 01:08 PM       Imaging Reviewed:  Xr Chest Pa Lat    Result Date: 9/28/2019  EXAM: CHEST RADIOGRAPH, TWO VIEWS CLINICAL INDICATION/HISTORY: Shortness of breath COMPARISON: 3/6/2015 TECHNIQUE: Frontal and lateral views of the chest were obtained. _______________ FINDINGS: SUPPORT DEVICES: None. HEART AND MEDIASTINUM: Cardiomediastinal silhouette appears within normal limits. Tortuous and atherosclerotic thoracic aorta. Mild central vascular congestion.  LUNGS AND PLEURAL SPACES: Diffusely increased interstitial markings, most prominent throughout central distributions. Mild left and right perihilar opacity, appearance most suggestive of atelectasis. Atelectasis is also seen throughout inferior right upper lobe. No pleural effusion or pneumothorax. BONY THORAX AND SOFT TISSUES: No acute osseous abnormality. _______________     IMPRESSION: Central vascular congestion and interstitial prominence, suggestive of pulmonary edema. Assessment/Plan     Active Hospital Problems    Diagnosis Date Noted    Uncontrolled hypertension 09/28/2019     Uncontrolled hypertension  Observation under telemetry monitor  Hydralazine for SBP>180  Asymptomatic after dialysis        - Cont acceptable home medications for chronic conditions   - DVT protocol and GI prophylaxis    I have personally reviewed all pertinent labs, films and EKGs that have officially resulted. I reviewed available electronic documentation outlining the initial presentation as well as the emergency room physician's encounter. Total time spent with patient and chart review, orders and documentation - 45min out of which more than 50% spent face-to-face with patient.     Junior Campbell MD  9/28/2019   9:51 PM      [address] [place] [address] [facility]

## 2019-09-29 NOTE — PROGRESS NOTES
Problem: Falls - Risk of  Goal: *Absence of Falls  Description  Document Grady Cantu Fall Risk and appropriate interventions in the flowsheet. Outcome: Progressing Towards Goal  Note:   Fall Risk Interventions:                                Problem: Patient Education: Go to Patient Education Activity  Goal: Patient/Family Education  Outcome: Progressing Towards Goal     Problem: Pressure Injury - Risk of  Goal: *Prevention of pressure injury  Description  Document Santos Scale and appropriate interventions in the flowsheet.   Outcome: Progressing Towards Goal  Note:   Pressure Injury Interventions:       Moisture Interventions: Absorbent underpads, Maintain skin hydration (lotion/cream)    Activity Interventions: Pressure redistribution bed/mattress(bed type)         Nutrition Interventions: Document food/fluid/supplement intake                     Problem: Patient Education: Go to Patient Education Activity  Goal: Patient/Family Education  Outcome: Progressing Towards Goal     Problem: Pain  Goal: *Control of Pain  Outcome: Progressing Towards Goal     Problem: Patient Education: Go to Patient Education Activity  Goal: Patient/Family Education  Outcome: Progressing Towards Goal

## 2019-09-29 NOTE — PROGRESS NOTES
Problem: Discharge Planning  Goal: *Discharge to safe environment  Outcome: Resolved/Met  Plan is home    Reason for Admission:   Htn                RRAT Score:                  Do you (patient/family) have any concerns for transition/discharge? no              Plan for utilizing home health:   no    Current Advanced Directive/Advance Care Plan:  no            Transition of Care Plan:    Pt here for htn. He goes to dialysis at [facility] MWF, either he drives himself or his niece drives him. He Had Medicare part a and b, and [facility] [employer]. He lives with wife and is independent with ADL. He uses no DME. Plan is home. Patient and/or next of kin has been given the Outpatient Observation Information and Notification letter and all questions answered. Care Management Interventions  PCP Verified by CM: Yes(next appt 10/11)  Palliative Care Criteria Met (RRAT>21 & CHF Dx)?: No  Mode of Transport at Discharge: Other (see comment)(wife)  Transition of Care Consult (CM Consult): Discharge Planning  Current Support Network: Lives with Spouse  Plan discussed with Pt/Family/Caregiver: Yes  Discharge Location  Discharge Placement: Home     KRISTAN Alas  [facility]  [phone], Pager [phone]  [email or web address]

## 2019-09-29 NOTE — PROGRESS NOTES
2330: arrived pt via stretcher, accompanied by ED tech,  Pt ambulated from the stretcher to bed with steady gait, oriented to room and unit routine    2355: pt hooked on tele monitor, verified to monitor tech, HR 70, A flutter, admission and required doc completed, dual skin assessment done with Pancho ALVES, no pain or discomforts, bed locked and low position, call bell within reach    0240: sleeping comfortably, call bell within reach    0353: sleeping, no change from previous assessment    0611: pt sleeping, no sign of ditress noted    Bedside and Verbal shift change report given to [facility] (oncoming nurse) by Naomie Chen RN (offgoing nurse). Report included the following information SBAR, Kardex, ED Summary, Intake/Output, MAR, Recent Results and Cardiac Rhythm A fluttroge on tele # 36.

## 2019-09-29 NOTE — ED NOTES
Pt placed on 2L/NC for supplemental oxygen at this time as 02 sats were around 86%.  Pt 02 sats now >92%

## 2019-09-29 NOTE — PROGRESS NOTES
Renal Progress Note  Follow up of ESRD     Assessment/Plan:  1. ESRD. Volume overload improved with yesterday's emergent dialysis. Next dialysis tomorrow, if d/paula- at op unit. 2. HTN. Improved with dialysis, volume removal. Continue current antihtn. 3. Anemia of esrd. GAYLA will be continued at op unit. 4. SHPT. Continue sensipar and phos binder. Subjective:  Patient presented to ED with sob, severe htn. Missed dialysis on Friday (had a doctor appt). Underwent dialysis last night, 2 liters pulled. Feels back to normal.   Patient complaints off: No SOB, chest pain, nausea or vomiting. Patient Active Problem List   Diagnosis Code    Essential hypertension I10    History of CVA (cerebrovascular accident) Z80.78    Prostate CA ([facility] [address] C61    Stroke ([facility] [address] I63.9    Cervical strain S16. 1XXA    Degenerative disc disease, cervical M50.30    Cellulitis of right lower extremity L03.115    CKD (chronic kidney disease) requiring chronic dialysis (HCC) N18.6, Z99.2    Recurrent major depressive disorder, in partial remission ([facility] [address] F33.41    Mixed hyperlipidemia E78.2    Macrocytic anemia D53.9    Alcohol abuse F10.10    Uncontrolled hypertension I10       Current Facility-Administered Medications   Medication Dose Route Frequency Provider Last Rate Last Dose    amLODIPine (NORVASC) tablet 10 mg  10 mg Oral DAILY Lucho Arreola MD   10 mg at 09/29/19 0947    0.9% sodium chloride infusion  50 mL/hr IntraVENous DIALYSIS PRN Shilo Cohen MD        aspirin-dipyridamole (AGGRENOX)  mg per capsule 1 Cap  1 Cap Oral BID Shilo Cohen MD   1 Cap at 09/29/19 0848    atorvastatin (LIPITOR) tablet 10 mg  10 mg Oral DAILY Shilo Cohen MD   10 mg at 09/29/19 0848    cinacalcet (SENSIPAR) tablet 30 mg  30 mg Oral DAILY Shilo Cohen MD   30 mg at 09/29/19 0848    cyanocobalamin tablet 500 mcg  500 mcg Oral DAILY Shilo Cohen MD   500 mcg at 09/29/19 0847    furosemide (LASIX) tablet 20 mg 20 mg Oral DAILY Shilo Cohen MD   20 mg at 09/29/19 0848    sertraline (ZOLOFT) tablet 200 mg  200 mg Oral DAILY Shilo Cohen MD   200 mg at 09/29/19 0849    sevelamer carbonate (RENVELA) tab 800 mg  800 mg Oral TID Shilo Cohen MD   800 mg at 09/29/19 0848    tamsulosin (FLOMAX) capsule 0.4 mg  0.4 mg Oral DAILY Shilo Cohen MD   0.4 mg at 09/29/19 0848    sodium chloride (NS) flush 5-40 mL  5-40 mL IntraVENous Q8H Shilo Cohen MD   10 mL at 09/29/19 0610    sodium chloride (NS) flush 5-40 mL  5-40 mL IntraVENous PRN Shilo Cohen MD        acetaminophen (TYLENOL) tablet 650 mg  650 mg Oral Q4H PRN Shilo Cohen MD        ondansetron (ZOFRAN) injection 4 mg  4 mg IntraVENous Q4H PRN Shilo Cohen MD        hydrALAZINE (APRESOLINE) 20 mg/mL injection 10 mg  10 mg IntraVENous Q6H PRN Shilo Cohen MD        famotidine (PEPCID) tablet 20 mg  20 mg Oral DAILY Shilo Cohen MD   20 mg at 09/29/19 0848             Objective:  Vitals:    09/28/19 2338 09/29/19 0432 09/29/19 0611 09/29/19 0705   BP: 174/62 168/69  166/75   Pulse: 72 72  75   Resp: 20 20  20   Temp: 99 °F (37.2 °C) 98.8 °F (37.1 °C)  98.7 °F (37.1 °C)   SpO2: 97% 93%  92%   Weight:   84.4 kg (186 lb)      . Intake/Output Summary (Last 24 hours) at 9/29/2019 0951  Last data filed at 9/28/2019 2355  Gross per 24 hour   Intake 240 ml   Output 2000 ml   Net -1760 ml       Admission weight:Weight: 84.4 kg (186 lb) (09/29/19 0611)    Last Weight Metrics:  Weight Loss Metrics 9/29/2019 6/12/2019 5/14/2019 4/9/2019 1/15/2019 10/11/2018 9/11/2018   Today's Wt 186 lb 187 lb 187 lb 189 lb 189 lb 181 lb 12.8 oz 182 lb   BMI 28.28 kg/m2 28.43 kg/m2 28.43 kg/m2 28.74 kg/m2 28.74 kg/m2 27.64 kg/m2 27.67 kg/m2            Physical Exam:     General: No acute distress. Neck: no jvd. LUNGS: good air entry, bl exp rhonchi. CVS EXM: S1, S2, RRR. No murmur, rub or gallop. Abdomen: Soft, Non Tender, non distended.   Lower Extremities: trace Edema. Access: Rt arm avf, dressings intact.        Labs:    CBC w/Diff Recent Labs     09/28/19  1308   WBC 8.0   RBC 2.95*   HGB 10.1*   HCT 31.2*      GRANS 72   LYMPH 13*   EOS 3        Chemistry Recent Labs     09/28/19  1308   *      K 5.0      CO2 29   BUN 66*   CREA 10.40*   CA 8.6   AGAP 7   BUCR 6*      TP 8.0   ALB 3.4   GLOB 4.6*   AGRAT 0.7*          Lab Results   Component Value Date/Time    Iron 63 10/08/2010 01:37 PM    TIBC 308 10/08/2010 01:37 PM    Iron % saturation 20 10/08/2010 01:37 PM      Lab Results   Component Value Date/Time    Calcium 8.6 09/28/2019 01:08 PM          Murali Jackson M.D  [facility]  Office [phone]  Pager [phone]

## 2019-09-30 LAB
HBV SURFACE AB SER QL IA: POSITIVE
HBV SURFACE AB SERPL IA-ACNC: 673.75 MIU/ML
HEP BS AB COMMENT,HBSAC: NORMAL

## 2019-10-03 DIAGNOSIS — F33.41 RECURRENT MAJOR DEPRESSIVE DISORDER, IN PARTIAL REMISSION (HCC): ICD-10-CM

## 2019-10-03 RX ORDER — SERTRALINE HYDROCHLORIDE 100 MG/1
TABLET, FILM COATED ORAL
Qty: 180 TAB | Refills: 1 | Status: SHIPPED | OUTPATIENT
Start: 2019-10-03 | End: 2020-03-27

## 2019-10-03 RX ORDER — ATORVASTATIN CALCIUM 10 MG/1
TABLET, FILM COATED ORAL
Qty: 90 TAB | Refills: 0 | Status: SHIPPED | OUTPATIENT
Start: 2019-10-03 | End: 2020-01-06

## 2019-10-09 ENCOUNTER — OFFICE VISIT (OUTPATIENT)
Dept: FAMILY MEDICINE CLINIC | Age: 79
End: 2019-10-09

## 2019-10-09 VITALS
HEART RATE: 75 BPM | OXYGEN SATURATION: 100 % | TEMPERATURE: 96.9 F | WEIGHT: 185 LBS | DIASTOLIC BLOOD PRESSURE: 68 MMHG | HEIGHT: 68 IN | RESPIRATION RATE: 16 BRPM | BODY MASS INDEX: 28.04 KG/M2 | SYSTOLIC BLOOD PRESSURE: 145 MMHG

## 2019-10-09 DIAGNOSIS — I10 ESSENTIAL HYPERTENSION: ICD-10-CM

## 2019-10-09 DIAGNOSIS — N18.6 CKD (CHRONIC KIDNEY DISEASE) REQUIRING CHRONIC DIALYSIS (HCC): Primary | ICD-10-CM

## 2019-10-09 DIAGNOSIS — Z99.2 CKD (CHRONIC KIDNEY DISEASE) REQUIRING CHRONIC DIALYSIS (HCC): Primary | ICD-10-CM

## 2019-10-09 NOTE — PROGRESS NOTES
1. Have you been to the ER, urgent care clinic since your last visit? Hospitalized since your last visit? Yes, went to [facility] on 9/28/2019 for HTN and SOB. 2. Have you seen or consulted any other health care providers outside of the [address] [place] [address] since your last visit? Include any pap smears or colon screening. Yes, saw Dr. Shahram Herrera Nephrologist last week.      Chief Complaint   Patient presents with   [facility] Follow Up     went to [facility] on 9/28/2019 for HTN

## 2019-10-18 NOTE — DISCHARGE SUMMARY
Discharge Summary    Patient: Lori Lopez               Sex: male          DOA: 9/28/2019         YOB: 1940      Age:  78 y.o.        LOS:  LOS: 0 days                Admit Date: 9/28/2019    Discharge Date: 9/29/2019    Discharge Medications:     Discharge Medication List as of 9/29/2019 11:14 AM      CONTINUE these medications which have CHANGED    Details   amLODIPine (NORVASC) 10 mg tablet Take 1 Tab by mouth daily. , Print, Disp-30 Tab, R-0         CONTINUE these medications which have NOT CHANGED    Details   atorvastatin (LIPITOR) 10 mg tablet Take 1 Tab by mouth daily. , Normal, Disp-30 Tab, R-2      aspirin-dipyridamole (AGGRENOX)  mg per SR capsule TAKE 1 CAPSULE BY MOUTH TWICE A DAY, Normal, Disp-180 Cap, R-1      sertraline (ZOLOFT) 100 mg tablet TAKE 2 TABLETS BY MOUTH EVERY DAY, Normal, Disp-180 Tab, R-1      furosemide (LASIX) 20 mg tablet Take 1 Tab by mouth daily. , Normal, Disp-30 Tab, R-2      cyanocobalamin (VITAMIN B-12) 500 mcg tablet Take 500 mcg by mouth daily. , Historical Med      tamsulosin (FLOMAX) 0.4 mg capsule TAKE 1 CAPSULE BY MOUTH EVERY DAY 1/2 HOUR FOLLOWING THE SAME MEAL EACH DAY, Historical Med, R-3      cinacalcet (SENSIPAR) 30 mg tablet Take 30 mg by mouth daily. , Historical Med      sevelamer carbonate (RENVELA) 800 mg tab tab Take 800 mg by mouth three (3) times daily. , Historical Med             Follow-up: pcp, dialysis    Discharge Condition: Stable    Activity: Activity as tolerated    Diet: Cardiac Diet, renal    Labs:  Labs: Results:       Chemistry No results for input(s): GLU, NA, K, CL, CO2, BUN, CREA, CA, AGAP, BUCR, TBIL, GPT, AP, TP, ALB, GLOB, AGRAT in the last 72 hours. CBC w/Diff No results for input(s): WBC, RBC, HGB, HCT, PLT, GRANS, LYMPH, EOS, HGBEXT, HCTEXT, PLTEXT in the last 72 hours. Cardiac Enzymes No results for input(s): CPK, CKND1, NATACHA in the last 72 hours.     No lab exists for component: CKRMB, TROIP   Coagulation No results for input(s): PTP, INR, APTT, INREXT in the last 72 hours. Lipid Panel Lab Results   Component Value Date/Time    Cholesterol, total 195 09/11/2018 03:10 PM    HDL Cholesterol 131 (H) 09/11/2018 03:10 PM    LDL, calculated 51.6 09/11/2018 03:10 PM    VLDL, calculated 12.4 09/11/2018 03:10 PM    Triglyceride 62 09/11/2018 03:10 PM    CHOL/HDL Ratio 1.5 09/11/2018 03:10 PM      BNP No results for input(s): BNPP in the last 72 hours. Liver Enzymes No results for input(s): TP, ALB, TBIL, AP, SGOT, GPT in the last 72 hours. No lab exists for component: DBIL   Thyroid Studies Lab Results   Component Value Date/Time    TSH 1.72 05/14/2019 10:06 AM          Imaging:    Xr Chest Pa Lat    Result Date: 9/28/2019  EXAM: CHEST RADIOGRAPH, TWO VIEWS CLINICAL INDICATION/HISTORY: Shortness of breath COMPARISON: 3/6/2015 TECHNIQUE: Frontal and lateral views of the chest were obtained. _______________ FINDINGS: SUPPORT DEVICES: None. HEART AND MEDIASTINUM: Cardiomediastinal silhouette appears within normal limits. Tortuous and atherosclerotic thoracic aorta. Mild central vascular congestion. LUNGS AND PLEURAL SPACES: Diffusely increased interstitial markings, most prominent throughout central distributions. Mild left and right perihilar opacity, appearance most suggestive of atelectasis. Atelectasis is also seen throughout inferior right upper lobe. No pleural effusion or pneumothorax. BONY THORAX AND SOFT TISSUES: No acute osseous abnormality. _______________     IMPRESSION: Central vascular congestion and interstitial prominence, suggestive of pulmonary edema. Consults: Nephrology    Treatment Team: Treatment Team: Consulting Provider: Ghada Felix MD; Primary Nurse: Federica Elena RN; Utilization Review: Shade Wiseman RN    Significant Diagnostic Studies: labs: No results found for this or any previous visit (from the past 25 hour(s)).       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) ICD-10-CM: N18.6, Z99.2  ICD-9-CM: 585.6, V45.11  9/11/2018 - Present        Recurrent major depressive disorder, in partial remission ([facility] ICD-10-CM: F33.41  ICD-9-CM: 296.35  9/11/2018 - Present        Essential hypertension ICD-10-CM: I10  ICD-9-CM: 401.9  7/18/2017 - Present        History of CVA (cerebrovascular accident) ICD-10-CM: Z86.73  ICD-9-CM: V12.54  7/18/2017 - Present    Overview Signed 7/18/2017  3:28 PM by Shamika Raza MD     confusion. occured in 2005             Cellulitis of right lower extremity ICD-10-CM: L03.115  ICD-9-CM: 682.6  2/12/2016 - Present        Cervical strain ICD-10-CM: S16. 1XXA  ICD-9-CM: 847.0  6/3/2013 - Present        Degenerative disc disease, cervical ICD-10-CM: M50.30  ICD-9-CM: 722.4  6/3/2013 - Present        Prostate CA ([facility] ICD-10-CM: C61  ICD-9-CM: 535  3/9/2012 - Present        Stroke [facility]) ICD-10-CM: I63.9  ICD-9-CM: 434.91  3/9/2012 - Present            Hospital Course:   Major issues addressed during hospitalization outlined  below. Ephraim Kramer is a 78 y.o. male who presented to the ED with shortness of breath that started yesterday after dinner. Urbano Hernandez is dialysis Monday Wednesday Friday, last dialysis 3 days ago because he had something to do yesterday so did not go to dialysis.   Symptoms are mild at rest, they really come on when he tries to ambulate. Urbano Hernandez has a cough and some rattling in his chest.  No fever nausea vomiting abdominal pain or chest pain.  No known lung disease COPD or asthma.  No known cardiac disease MIs or CHF.     ED evaluation revealed pulmonary congestion, he was dialysed from the ED. Upon return from dialysis he was hypertensive with SBP>200. Patient quickly improved with minimal medication given. Only norvasc was started on discharge. Stay was less than 24 hrs. pcp f/u in 1 week,. Dc home.     Zion Castillo MD  October 18, 2019        Total time spent 40 minutes

## 2019-10-28 ENCOUNTER — PATIENT OUTREACH (OUTPATIENT)
Dept: FAMILY MEDICINE CLINIC | Age: 79
End: 2019-10-28

## 2019-12-01 ENCOUNTER — HOSPITAL ENCOUNTER (EMERGENCY)
Age: 79
Discharge: HOME | End: 2019-12-01
Attending: EMERGENCY MEDICINE | Admitting: EMERGENCY MEDICINE
Payer: MEDICARE

## 2019-12-01 ENCOUNTER — APPOINTMENT (OUTPATIENT)
Dept: VASCULAR SURGERY | Age: 79
End: 2019-12-01
Attending: PHYSICIAN ASSISTANT
Payer: MEDICARE

## 2019-12-01 ENCOUNTER — APPOINTMENT (OUTPATIENT)
Dept: GENERAL RADIOLOGY | Age: 79
End: 2019-12-01
Attending: PHYSICIAN ASSISTANT
Payer: MEDICARE

## 2019-12-01 VITALS
BODY MASS INDEX: 28.04 KG/M2 | OXYGEN SATURATION: 95 % | WEIGHT: 185 LBS | HEART RATE: 70 BPM | SYSTOLIC BLOOD PRESSURE: 186 MMHG | HEIGHT: 68 IN | RESPIRATION RATE: 16 BRPM | DIASTOLIC BLOOD PRESSURE: 78 MMHG | TEMPERATURE: 98.3 F

## 2019-12-01 DIAGNOSIS — Z99.2 STAGE 5 CHRONIC KIDNEY DISEASE ON CHRONIC DIALYSIS (HCC): ICD-10-CM

## 2019-12-01 DIAGNOSIS — J06.9 ACUTE UPPER RESPIRATORY INFECTION: ICD-10-CM

## 2019-12-01 DIAGNOSIS — M25.551 PAIN OF RIGHT HIP JOINT: Primary | ICD-10-CM

## 2019-12-01 DIAGNOSIS — N18.6 STAGE 5 CHRONIC KIDNEY DISEASE ON CHRONIC DIALYSIS (HCC): ICD-10-CM

## 2019-12-01 LAB
ALBUMIN SERPL-MCNC: 3.3 G/DL (ref 3.4–5)
ALBUMIN/GLOB SERPL: 0.7 {RATIO} (ref 0.8–1.7)
ALP SERPL-CCNC: 82 U/L (ref 45–117)
ALT SERPL-CCNC: 27 U/L (ref 16–61)
ANION GAP SERPL CALC-SCNC: 7 MMOL/L (ref 3–18)
AST SERPL-CCNC: 19 U/L (ref 10–38)
BASOPHILS # BLD: 0 K/UL (ref 0–0.1)
BASOPHILS NFR BLD: 1 % (ref 0–2)
BILIRUB SERPL-MCNC: 0.5 MG/DL (ref 0.2–1)
BNP SERPL-MCNC: ABNORMAL PG/ML (ref 0–1800)
BUN SERPL-MCNC: 56 MG/DL (ref 7–18)
BUN/CREAT SERPL: 5 (ref 12–20)
CALCIUM SERPL-MCNC: 9.2 MG/DL (ref 8.5–10.1)
CHLORIDE SERPL-SCNC: 95 MMOL/L (ref 100–111)
CO2 SERPL-SCNC: 30 MMOL/L (ref 21–32)
CREAT SERPL-MCNC: 10.4 MG/DL (ref 0.6–1.3)
DIFFERENTIAL METHOD BLD: ABNORMAL
EOSINOPHIL # BLD: 0.3 K/UL (ref 0–0.4)
EOSINOPHIL NFR BLD: 4 % (ref 0–5)
ERYTHROCYTE [DISTWIDTH] IN BLOOD BY AUTOMATED COUNT: 16.8 % (ref 11.6–14.5)
GLOBULIN SER CALC-MCNC: 4.7 G/DL (ref 2–4)
GLUCOSE SERPL-MCNC: 109 MG/DL (ref 74–99)
HCT VFR BLD AUTO: 32.7 % (ref 36–48)
HGB BLD-MCNC: 10.5 G/DL (ref 13–16)
INR PPP: 1 (ref 0.8–1.2)
LYMPHOCYTES # BLD: 1.7 K/UL (ref 0.9–3.6)
LYMPHOCYTES NFR BLD: 24 % (ref 21–52)
MCH RBC QN AUTO: 33.4 PG (ref 24–34)
MCHC RBC AUTO-ENTMCNC: 32.1 G/DL (ref 31–37)
MCV RBC AUTO: 104.1 FL (ref 74–97)
MONOCYTES # BLD: 0.8 K/UL (ref 0.05–1.2)
MONOCYTES NFR BLD: 11 % (ref 3–10)
NEUTS SEG # BLD: 4.1 K/UL (ref 1.8–8)
NEUTS SEG NFR BLD: 60 % (ref 40–73)
PLATELET # BLD AUTO: 168 K/UL (ref 135–420)
PMV BLD AUTO: 10.3 FL (ref 9.2–11.8)
POTASSIUM SERPL-SCNC: 5.4 MMOL/L (ref 3.5–5.5)
PROT SERPL-MCNC: 8 G/DL (ref 6.4–8.2)
PROTHROMBIN TIME: 13.4 SEC (ref 11.5–15.2)
RBC # BLD AUTO: 3.14 M/UL (ref 4.7–5.5)
SODIUM SERPL-SCNC: 132 MMOL/L (ref 136–145)
TROPONIN I SERPL-MCNC: 0.03 NG/ML (ref 0–0.04)
WBC # BLD AUTO: 6.9 K/UL (ref 4.6–13.2)

## 2019-12-01 PROCEDURE — 74011250636 HC RX REV CODE- 250/636: Performed by: PHYSICIAN ASSISTANT

## 2019-12-01 PROCEDURE — 99285 EMERGENCY DEPT VISIT HI MDM: CPT

## 2019-12-01 PROCEDURE — 83880 ASSAY OF NATRIURETIC PEPTIDE: CPT

## 2019-12-01 PROCEDURE — 71046 X-RAY EXAM CHEST 2 VIEWS: CPT

## 2019-12-01 PROCEDURE — 96374 THER/PROPH/DIAG INJ IV PUSH: CPT

## 2019-12-01 PROCEDURE — 74011250637 HC RX REV CODE- 250/637: Performed by: PHYSICIAN ASSISTANT

## 2019-12-01 PROCEDURE — 93971 EXTREMITY STUDY: CPT

## 2019-12-01 PROCEDURE — 84484 ASSAY OF TROPONIN QUANT: CPT

## 2019-12-01 PROCEDURE — 93005 ELECTROCARDIOGRAM TRACING: CPT

## 2019-12-01 PROCEDURE — 80053 COMPREHEN METABOLIC PANEL: CPT

## 2019-12-01 PROCEDURE — 85025 COMPLETE CBC W/AUTO DIFF WBC: CPT

## 2019-12-01 PROCEDURE — 73502 X-RAY EXAM HIP UNI 2-3 VIEWS: CPT

## 2019-12-01 PROCEDURE — 85610 PROTHROMBIN TIME: CPT

## 2019-12-01 RX ORDER — AZITHROMYCIN 250 MG/1
TABLET, FILM COATED ORAL
Qty: 5 TAB | Refills: 0 | Status: SHIPPED | OUTPATIENT
Start: 2019-12-01 | End: 2019-12-06

## 2019-12-01 RX ORDER — ACETAMINOPHEN 500 MG
1000 TABLET ORAL
Status: COMPLETED | OUTPATIENT
Start: 2019-12-01 | End: 2019-12-01

## 2019-12-01 RX ORDER — LIDOCAINE 50 MG/G
PATCH TOPICAL
Qty: 1 PACKAGE | Refills: 0 | Status: SHIPPED | OUTPATIENT
Start: 2019-12-01

## 2019-12-01 RX ORDER — FUROSEMIDE 10 MG/ML
20 INJECTION INTRAMUSCULAR; INTRAVENOUS
Status: COMPLETED | OUTPATIENT
Start: 2019-12-01 | End: 2019-12-01

## 2019-12-01 RX ORDER — AZITHROMYCIN 250 MG/1
500 TABLET, FILM COATED ORAL
Status: COMPLETED | OUTPATIENT
Start: 2019-12-01 | End: 2019-12-01

## 2019-12-01 RX ORDER — BENZONATATE 100 MG/1
100 CAPSULE ORAL
Qty: 21 CAP | Refills: 0 | Status: SHIPPED | OUTPATIENT
Start: 2019-12-01 | End: 2019-12-08

## 2019-12-01 RX ORDER — BENZONATATE 100 MG/1
200 CAPSULE ORAL
Status: COMPLETED | OUTPATIENT
Start: 2019-12-01 | End: 2019-12-01

## 2019-12-01 RX ORDER — ACETAMINOPHEN 500 MG
1000 TABLET ORAL
Qty: 20 TAB | Refills: 0 | Status: SHIPPED | OUTPATIENT
Start: 2019-12-01

## 2019-12-01 RX ORDER — METOPROLOL TARTRATE 25 MG/1
25 TABLET, FILM COATED ORAL 2 TIMES DAILY
COMMUNITY

## 2019-12-01 RX ADMIN — AZITHROMYCIN MONOHYDRATE 500 MG: 250 TABLET ORAL at 20:46

## 2019-12-01 RX ADMIN — ACETAMINOPHEN 1000 MG: 500 TABLET, FILM COATED ORAL at 20:46

## 2019-12-01 RX ADMIN — BENZONATATE 200 MG: 100 CAPSULE ORAL at 20:46

## 2019-12-01 RX ADMIN — FUROSEMIDE 20 MG: 10 INJECTION, SOLUTION INTRAMUSCULAR; INTRAVENOUS at 20:46

## 2019-12-01 NOTE — ED TRIAGE NOTES
R buttocks pain and R leg pain, denies recent injury/ trauma to the leg. Onset one week.  States it feels like it may give out on him    Productive cough, with brown sputum x one week upright (90 degrees)

## 2019-12-01 NOTE — ED NOTES
I performed a brief history of the patient here in triage and I have determined that pt will need further treatment and evaluation from the main side ER physician. I have placed initial orders based on the history to help in expediting patients care.        Visit Vitals  /83 (BP 1 Location: Right arm, BP Patient Position: At rest;Sitting)   Pulse 70   Temp 98.3 °F (36.8 °C)   Resp 16   Ht 5' 8\" (1.727 m)   Wt 83.9 kg (185 lb)   SpO2 95%   BMI 28.13 kg/m²      SAMEER Almonte 1:38 PM

## 2019-12-01 NOTE — ED PROVIDER NOTES
EMERGENCY DEPARTMENT HISTORY AND PHYSICAL EXAM    6:58 PM      Date: 12/1/2019  Patient Name: Jarrett Fiore    History of Presenting Illness     Chief Complaint   Patient presents with    Hip Pain    Leg Pain    Cough    Shortness of Breath       History Provided By: Patient    Additional History (Context): Jarrett Fiore is a 78 y.o. male with hx of HTN, ESRD (dialysis M,W,F), HLD, DVT, CVA who presents with complaint of right posterior hip pain with radiation to right lower extremity x1 week. Patient notes leg feels weak. Denies fever or chills, injury or trauma, numbness or tingling, leg swelling or discoloration, urinary/bowel incontinence/retention. Also notes productive cough x1 week. Denies chest pain, shortness of breath, orthopnea, nausea or vomiting. Denies sick contacts. On Eliquis. Completed dialysis this week as scheduled. PCP: Amalia Babin MD    Current Facility-Administered Medications   Medication Dose Route Frequency Provider Last Rate Last Dose    azithromycin (ZITHROMAX) tablet 500 mg  500 mg Oral NOW Kerri Gooden, [place]        benzonatate (TESSALON) capsule 200 mg  200 mg Oral NOW [place], Sage [place], [place]        acetaminophen (TYLENOL) tablet 1,000 mg  1,000 mg Oral NOW [place], Anne [place], [place]        furosemide (LASIX) injection 20 mg  20 mg IntraVENous NOW [place], Sage [place], [place]         Current Outpatient Medications   Medication Sig Dispense Refill    metoprolol tartrate (LOPRESSOR) 25 mg tablet Take 25 mg by mouth two (2) times a day.  acetaminophen (TYLENOL EXTRA STRENGTH) 500 mg tablet Take 2 Tabs by mouth every six (6) hours as needed for Pain. 20 Tab 0    lidocaine (LIDODERM) 5 % Apply patch to the affected area for 12 hours a day and remove for 12 hours a day.  1 Package 0    azithromycin (ZITHROMAX) 250 mg tablet Take medication as prescribed 5 Tab 0    benzonatate (TESSALON PERLES) 100 mg capsule Take 1 Cap by mouth three (3) times daily as needed for Cough for up to 7 days. 21 Cap 0    sucroferric oxyhydroxide (VELPHORO) 500 mg chew chewable tablet Take  by mouth three (3) times daily (with meals).  apixaban (ELIQUIS) 2.5 mg tablet Take 2.5 mg by mouth two (2) times a day.  atorvastatin (LIPITOR) 10 mg tablet TAKE 1 TABLET BY MOUTH EVERY DAY 90 Tab 0    sertraline (ZOLOFT) 100 mg tablet TAKE 2 TABLETS BY MOUTH EVERY  Tab 1    furosemide (LASIX) 20 mg tablet Take 1 Tab by mouth daily. 30 Tab 2    cyanocobalamin (VITAMIN B-12) 500 mcg tablet Take 500 mcg by mouth daily.  tamsulosin (FLOMAX) 0.4 mg capsule TAKE 1 CAPSULE BY MOUTH EVERY DAY 1/2 HOUR FOLLOWING THE SAME MEAL EACH DAY  3    cinacalcet (SENSIPAR) 30 mg tablet Take 30 mg by mouth daily.  amLODIPine (NORVASC) 10 mg tablet Take 1 Tab by mouth daily. 30 Tab 0    aspirin-dipyridamole (AGGRENOX)  mg per SR capsule TAKE 1 CAPSULE BY MOUTH TWICE A  Cap 1    sevelamer carbonate (RENVELA) 800 mg tab tab Take 800 mg by mouth three (3) times daily.          Past History     Past Medical History:  Past Medical History:   Diagnosis Date    Arthritis     Cancer ([facility]     Chronic kidney disease     dialysis    Community acquired pneumonia     Decreased exercise tolerance     Depression     Dialysis patient ([facility]     Difficulty urinating     Dizziness     Hypercholesteremia     Hypercholesterolemia     Hypertension     Other malignant neoplasm without specification of site     Prostate CA ([facility] 2007    s/p combined radiation therapy    Prostate cancer ([facility]     Renal failure     Stroke ([facility]     Urinary frequency        Past Surgical History:  Past Surgical History:   Procedure Laterality Date    HX COLONOSCOPY  2015    HX HEENT  cataract sx    HX HEENT      Cataract surgery 2002    HX OTHER SURGICAL  09/10/2014    DIALYSIS FISTULA OR GRAFT    HX OTHER SURGICAL  07/20/2015    VENOUS ACCESS CATHETER INSERTION    HX OTHER SURGICAL  11/03/2016    VENOUS ACCESS CATHETER INSERTION    HX OTHER SURGICAL  11/04/2016    ARTERIOVENOUS FISTULA REVISION    HX UROLOGICAL  2007    INSERTION RADIOACTIVE SEEDS       Family History:  History reviewed. No pertinent family history. Social History:  Social History     Tobacco Use    Smoking status: Never Smoker    Smokeless tobacco: Never Used   Substance Use Topics    Alcohol use: Yes     Alcohol/week: 8.0 standard drinks     Types: 1 Cans of beer, 5 Shots of liquor per week     Comment: occasional    Drug use: No       Allergies:  No Known Allergies      Review of Systems       Review of Systems   Constitutional: Negative for chills and fever. Respiratory: Positive for cough. Negative for shortness of breath. Cardiovascular: Negative for chest pain. Gastrointestinal: Negative for abdominal pain, nausea and vomiting. Musculoskeletal: Positive for myalgias. Skin: Negative for rash. Neurological: Negative for weakness. All other systems reviewed and are negative. Physical Exam     Visit Vitals  /78   Pulse 70   Temp 98.3 °F (36.8 °C)   Resp 16   Ht 5' 8\" (1.727 m)   Wt 83.9 kg (185 lb)   SpO2 95%   BMI 28.13 kg/m²         Physical Exam  Vitals signs and nursing note reviewed. Constitutional:       General: He is not in acute distress. Appearance: He is well-developed. He is not diaphoretic. HENT:      Head: Normocephalic and atraumatic. Neck:      Musculoskeletal: Normal range of motion and neck supple. Cardiovascular:      Rate and Rhythm: Normal rate and regular rhythm. Heart sounds: Normal heart sounds. No murmur. No friction rub. No gallop. Pulmonary:      Effort: Pulmonary effort is normal. No respiratory distress. Breath sounds: Normal breath sounds. No stridor. No decreased breath sounds, wheezing or rales. Musculoskeletal: Normal range of motion. Right hip: He exhibits normal range of motion, normal strength, no swelling and no crepitus.         Legs: Comments: Sensation intact to RLE, dorsalis pedis 2+, no leg edema or discoloration    Skin:     General: Skin is warm. Findings: No rash. Comments: LUE with graft with good thrill    Neurological:      Mental Status: He is alert. Diagnostic Study Results     Labs -  Recent Results (from the past 12 hour(s))   CBC WITH AUTOMATED DIFF    Collection Time: 12/01/19  2:26 PM   Result Value Ref Range    WBC 6.9 4.6 - 13.2 K/uL    RBC 3.14 (L) 4.70 - 5.50 M/uL    HGB 10.5 (L) 13.0 - 16.0 g/dL    HCT 32.7 (L) 36.0 - 48.0 %    .1 (H) 74.0 - 97.0 FL    MCH 33.4 24.0 - 34.0 PG    MCHC 32.1 31.0 - 37.0 g/dL    RDW 16.8 (H) 11.6 - 14.5 %    PLATELET 645 212 - 527 K/uL    MPV 10.3 9.2 - 11.8 FL    NEUTROPHILS 60 40 - 73 %    LYMPHOCYTES 24 21 - 52 %    MONOCYTES 11 (H) 3 - 10 %    EOSINOPHILS 4 0 - 5 %    BASOPHILS 1 0 - 2 %    ABS. NEUTROPHILS 4.1 1.8 - 8.0 K/UL    ABS. LYMPHOCYTES 1.7 0.9 - 3.6 K/UL    ABS. MONOCYTES 0.8 0.05 - 1.2 K/UL    ABS. EOSINOPHILS 0.3 0.0 - 0.4 K/UL    ABS. BASOPHILS 0.0 0.0 - 0.1 K/UL    DF AUTOMATED     METABOLIC PANEL, COMPREHENSIVE    Collection Time: 12/01/19  2:26 PM   Result Value Ref Range    Sodium 132 (L) 136 - 145 mmol/L    Potassium 5.4 3.5 - 5.5 mmol/L    Chloride 95 (L) 100 - 111 mmol/L    CO2 30 21 - 32 mmol/L    Anion gap 7 3.0 - 18 mmol/L    Glucose 109 (H) 74 - 99 mg/dL    BUN 56 (H) 7.0 - 18 MG/DL    Creatinine 10.40 (H) 0.6 - 1.3 MG/DL    BUN/Creatinine ratio 5 (L) 12 - 20      GFR est AA 6 (L) >60 ml/min/1.73m2    GFR est non-AA 5 (L) >60 ml/min/1.73m2    Calcium 9.2 8.5 - 10.1 MG/DL    Bilirubin, total 0.5 0.2 - 1.0 MG/DL    ALT (SGPT) 27 16 - 61 U/L    AST (SGOT) 19 10 - 38 U/L    Alk.  phosphatase 82 45 - 117 U/L    Protein, total 8.0 6.4 - 8.2 g/dL    Albumin 3.3 (L) 3.4 - 5.0 g/dL    Globulin 4.7 (H) 2.0 - 4.0 g/dL    A-G Ratio 0.7 (L) 0.8 - 1.7     TROPONIN I    Collection Time: 12/01/19  2:26 PM   Result Value Ref Range    Troponin-I, QT 0.03 0.0 - 0.045 NG/ML   NT-PRO BNP    Collection Time: 12/01/19  2:26 PM   Result Value Ref Range    NT pro-BNP 40,238 (H) 0 - 1,800 PG/ML   EKG, 12 LEAD, INITIAL    Collection Time: 12/01/19  2:29 PM   Result Value Ref Range    Ventricular Rate 76 BPM    Atrial Rate 76 BPM    P-R Interval 248 ms    QRS Duration 74 ms    Q-T Interval 366 ms    QTC Calculation (Bezet) 411 ms    Calculated P Axis 45 degrees    Calculated R Axis 9 degrees    Calculated T Axis 64 degrees    Diagnosis       Poor data quality, interpretation may be adversely affected  Sinus rhythm with 1st degree AV block  Otherwise normal ECG  When compared with ECG of 28-SEP-2019 13:19,  No significant change was found     PROTHROMBIN TIME + INR    Collection Time: 12/01/19  6:55 PM   Result Value Ref Range    Prothrombin time 13.4 11.5 - 15.2 sec    INR 1.0 0.8 - 1.2         Radiologic Studies -   XR CHEST PA LAT   Final Result   IMPRESSION:      No active cardiopulmonary disease. XR HIP RT W OR WO PELV 2-3 VWS    (Results Pending)   no acute process       Medical Decision Making   I am the first provider for this patient. I reviewed the vital signs, available nursing notes, past medical history, past surgical history, family history and social history. Vital Signs-Reviewed the patient's vital signs. Pulse Oximetry Analysis -  97 on room air    Cardiac Monitor:  Rate: 75  Rhythm: NSR    Records Reviewed: Nursing Notes and Old Medical Records (Time of Review: 6:58 PM)    ED Course: Progress Notes, Reevaluation, and Consults:  8:43 PM Reviewed results with patient. Resting comfortably, no distress. Discussed need for close outpatient follow-up this week for reassessment. Discussed strict return precautions, including chest pain, shortness of breath, fever, or any other medical concerns. Pt in agreement with plan.      Provider Notes (Medical Decision Making): 68-year-old male with hx of ESRD on dialysis, HTN, HLD who presents with complaint of right posterior hip pain radiating into the LE for 1 week. No injury or trauma. No leg swelling or discoloration. Extremity neurovascularly intact, strength 5/5 in LE.  X-ray without evidence of acute process. Doppler study negative for DVT. Also notes productive cough x 1 week. Afebrile, nontoxic-appearing. EKG without ischemic changes, troponin negative. No chest pain or shortness of breath, no orthopnea or leg edema. Chest x-ray without evidence of pneumonia, vascular congestion. Signs and symptoms consistent with upper respiratory infection. Stable for discharge with symptomatic management and antibiotics. Patient looks well, no distress. He will follow-up with primary care physician this week      Diagnosis     Clinical Impression:   1. Pain of right hip joint    2. Acute upper respiratory infection    3. Stage 5 chronic kidney disease on chronic dialysis [facility])        Disposition: home     Follow-up Information     Follow up With Specialties Details Why [address] [facility] EMERGENCY DEPT Emergency Medicine  If symptoms worsen [address] [place] [address]    Kavitha Harris MD Internal Medicine In 2 days  [address]  [phone]             Patient's Medications   Start Taking    ACETAMINOPHEN (TYLENOL EXTRA STRENGTH) 500 MG TABLET    Take 2 Tabs by mouth every six (6) hours as needed for Pain. AZITHROMYCIN (ZITHROMAX) 250 MG TABLET    Take medication as prescribed    BENZONATATE (TESSALON PERLES) 100 MG CAPSULE    Take 1 Cap by mouth three (3) times daily as needed for Cough for up to 7 days. LIDOCAINE (LIDODERM) 5 %    Apply patch to the affected area for 12 hours a day and remove for 12 hours a day. Continue Taking    AMLODIPINE (NORVASC) 10 MG TABLET    Take 1 Tab by mouth daily. APIXABAN (ELIQUIS) 2.5 MG TABLET    Take 2.5 mg by mouth two (2) times a day.     ASPIRIN-DIPYRIDAMOLE (AGGRENOX)  MG PER SR CAPSULE TAKE 1 CAPSULE BY MOUTH TWICE A DAY    ATORVASTATIN (LIPITOR) 10 MG TABLET    TAKE 1 TABLET BY MOUTH EVERY DAY    CINACALCET (SENSIPAR) 30 MG TABLET    Take 30 mg by mouth daily. CYANOCOBALAMIN (VITAMIN B-12) 500 MCG TABLET    Take 500 mcg by mouth daily. FUROSEMIDE (LASIX) 20 MG TABLET    Take 1 Tab by mouth daily. METOPROLOL TARTRATE (LOPRESSOR) 25 MG TABLET    Take 25 mg by mouth two (2) times a day. SERTRALINE (ZOLOFT) 100 MG TABLET    TAKE 2 TABLETS BY MOUTH EVERY DAY    SEVELAMER CARBONATE (RENVELA) 800 MG TAB TAB    Take 800 mg by mouth three (3) times daily. SUCROFERRIC OXYHYDROXIDE (VELPHORO) 500 MG CHEW CHEWABLE TABLET    Take  by mouth three (3) times daily (with meals). TAMSULOSIN (FLOMAX) 0.4 MG CAPSULE    TAKE 1 CAPSULE BY MOUTH EVERY DAY 1/2 HOUR FOLLOWING THE SAME MEAL EACH DAY   These Medications have changed    No medications on file   Stop Taking    No medications on file       Dictation disclaimer:  Please note that this dictation was completed with TAPP, the [employer] voice recognition software. Quite often unanticipated grammatical, syntax, homophones, and other interpretive errors are inadvertently transcribed by the computer software. Please disregard these errors. Please excuse any errors that have escaped final proofreading.

## 2019-12-02 LAB
ATRIAL RATE: 76 BPM
CALCULATED P AXIS, ECG09: 45 DEGREES
CALCULATED R AXIS, ECG10: 9 DEGREES
CALCULATED T AXIS, ECG11: 64 DEGREES
DIAGNOSIS, 93000: NORMAL
P-R INTERVAL, ECG05: 248 MS
Q-T INTERVAL, ECG07: 366 MS
QRS DURATION, ECG06: 74 MS
QTC CALCULATION (BEZET), ECG08: 411 MS
VENTRICULAR RATE, ECG03: 76 BPM

## 2019-12-02 NOTE — ED NOTES
I have reviewed discharge instructions with the patient. The patient verbalized understanding. 4 prescriptions given. Pt ambulated out of the ED.

## 2019-12-02 NOTE — DISCHARGE INSTRUCTIONS
Patient Education      Take medication as prescribed. Follow-up with your primary care physician within 2 days for reassessment. Bring the results from this visit with you for their review. Return to the ED immediately for any new, worsening, or persistent symptoms, including fever, chest pain, shortness of breath, or any other medical concerns. Hip Pain: Care Instructions  Your Care Instructions    Hip pain may be caused by many things, including overuse, a fall, or a twisting movement. Another cause of hip pain is arthritis. Your pain may increase when you stand up, walk, or squat. The pain may come and go or may be constant. Home treatment can help relieve hip pain, swelling, and stiffness. If your pain is ongoing, you may need more tests and treatment. Follow-up care is a key part of your treatment and safety. Be sure to make and go to all appointments, and call your doctor if you are having problems. It's also a good idea to know your test results and keep a list of the medicines you take. How can you care for yourself at home? · Take pain medicines exactly as directed. ? If the doctor gave you a prescription medicine for pain, take it as prescribed. ? If you are not taking a prescription pain medicine, ask your doctor if you can take an over-the-counter medicine. · Rest and protect your hip. Take a break from any activity, including standing or walking, that may cause pain. · Put ice or a cold pack against your hip for 10 to 20 minutes at a time. Try to do this every 1 to 2 hours for the next 3 days (when you are awake) or until the swelling goes down. Put a thin cloth between the ice and your skin. · Sleep on your healthy side with a pillow between your knees, or sleep on your back with pillows under your knees. · If there is no swelling, you can put moist heat, a heating pad, or a warm cloth on your hip. Do gentle stretching exercises to help keep your hip flexible.   · Learn how to prevent falls. Have your vision and hearing checked regularly. Wear slippers or shoes with a nonskid sole. · Stay at a healthy weight. · Wear comfortable shoes. When should you call for help? Call 911 anytime you think you may need emergency care. For example, call if:    · You have sudden chest pain and shortness of breath, or you cough up blood.     · You are not able to stand or walk or bear weight.     · Your buttocks, legs, or feet feel numb or tingly.     · Your leg or foot is cool or pale or changes color.     · You have severe pain.    Call your doctor now or seek immediate medical care if:    · You have signs of infection, such as:  ? Increased pain, swelling, warmth, or redness in the hip area. ? Red streaks leading from the hip area. ? Pus draining from the hip area. ? A fever.     · You have signs of a blood clot, such as:  ? Pain in your calf, back of the knee, thigh, or groin. ? Redness and swelling in your leg or groin.     · You are not able to bend, straighten, or move your leg normally.     · You have trouble urinating or having bowel movements.    Watch closely for changes in your health, and be sure to contact your doctor if:    · You do not get better as expected. Where can you learn more? Go to http://lizy-maylin.info/. Enter J835 in the search box to learn more about \"Hip Pain: Care Instructions. \"  Current as of: June 26, 2019  Content Version: 12.2  © 2803-6264 [employer]. Care instructions adapted under license by [employer] (which disclaims liability or warranty for this information). If you have questions about a medical condition or this instruction, always ask your healthcare professional. [place] [address] any warranty or liability for your use of this information.          Patient Education        Upper Respiratory Infection (Cold): Care Instructions  Your Care Instructions    An upper respiratory infection, or URI, is an infection of the nose, sinuses, or throat. URIs are spread by coughs, sneezes, and direct contact. The common cold is the most frequent kind of URI. The flu and sinus infections are other kinds of URIs. Almost all URIs are caused by viruses. Antibiotics won't cure them. But you can treat most infections with home care. This may include drinking lots of fluids and taking over-the-counter pain medicine. You will probably feel better in 4 to 10 days. The doctor has checked you carefully, but problems can develop later. If you notice any problems or new symptoms, get medical treatment right away. Follow-up care is a key part of your treatment and safety. Be sure to make and go to all appointments, and call your doctor if you are having problems. It's also a good idea to know your test results and keep a list of the medicines you take. How can you care for yourself at home? · To prevent dehydration, drink plenty of fluids, enough so that your urine is light yellow or clear like water. Choose water and other caffeine-free clear liquids until you feel better. If you have kidney, heart, or liver disease and have to limit fluids, talk with your doctor before you increase the amount of fluids you drink. · Take an over-the-counter pain medicine, such as acetaminophen (Tylenol), ibuprofen (Advil, Motrin), or naproxen (Aleve). Read and follow all instructions on the label. · Before you use cough and cold medicines, check the label. These medicines may not be safe for young children or for people with certain health problems. · Be careful when taking over-the-counter cold or flu medicines and Tylenol at the same time. Many of these medicines have acetaminophen, which is Tylenol. Read the labels to make sure that you are not taking more than the recommended dose. Too much acetaminophen (Tylenol) can be harmful. · Get plenty of rest.  · Do not smoke or allow others to smoke around you.  If you need help quitting, talk to your doctor about stop-smoking programs and medicines. These can increase your chances of quitting for good. When should you call for help? Call 911 anytime you think you may need emergency care. For example, call if:    · You have severe trouble breathing.    Call your doctor now or seek immediate medical care if:    · You seem to be getting much sicker.     · You have new or worse trouble breathing.     · You have a new or higher fever.     · You have a new rash.    Watch closely for changes in your health, and be sure to contact your doctor if:    · You have a new symptom, such as a sore throat, an earache, or sinus pain.     · You cough more deeply or more often, especially if you notice more mucus or a change in the color of your mucus.     · You do not get better as expected. Where can you learn more? Go to http://lizy-maylin.info/. Enter E645 in the search box to learn more about \"Upper Respiratory Infection (Cold): Care Instructions. \"  Current as of: June 9, 2019  Content Version: 12.2  © 5780-1808 [employer]. Care instructions adapted under license by [employer] (which disclaims liability or warranty for this information). If you have questions about a medical condition or this instruction, always ask your healthcare professional. [address] any warranty or liability for your use of this information.

## 2019-12-11 ENCOUNTER — OFFICE VISIT (OUTPATIENT)
Dept: FAMILY MEDICINE CLINIC | Age: 79
End: 2019-12-11

## 2019-12-11 VITALS
RESPIRATION RATE: 16 BRPM | DIASTOLIC BLOOD PRESSURE: 57 MMHG | WEIGHT: 185 LBS | SYSTOLIC BLOOD PRESSURE: 109 MMHG | HEIGHT: 68 IN | TEMPERATURE: 95.3 F | HEART RATE: 60 BPM | BODY MASS INDEX: 28.04 KG/M2 | OXYGEN SATURATION: 98 %

## 2019-12-11 DIAGNOSIS — M48.061 SPINAL STENOSIS OF LUMBAR REGION WITHOUT NEUROGENIC CLAUDICATION: Primary | ICD-10-CM

## 2019-12-11 PROBLEM — F33.41 RECURRENT MAJOR DEPRESSIVE DISORDER, IN PARTIAL REMISSION (HCC): Status: RESOLVED | Noted: 2018-09-11 | Resolved: 2019-12-11

## 2019-12-11 NOTE — PROGRESS NOTES
1. Have you been to the ER, urgent care clinic since your last visit? Hospitalized since your last visit? Yes, went to [place] [address] ER on 12/1/2019 for leg numbness and hip pain. 2. Have you seen or consulted any other health care providers outside of the [address] [place] [address] since your last visit? Include any pap smears or colon screening. Yes, saw Dr Luis Alberto Aljeo (cardio) last week. Chief Complaint   Patient presents with    ED Follow-up     went to [place] [address] ER on 12/1/2019 for right leg numbness and hip pain.

## 2019-12-11 NOTE — PATIENT INSTRUCTIONS
Lumbar Spinal Stenosis: Care Instructions Your Care Instructions Stenosis in the spine is a narrowing of the canal that is around the spinal cord and nerve roots in your back. It can happen as part of aging. Sometimes bone and other tissue grow into this canal and press on the nerves that branch out from the spinal cord. This can cause pain, numbness, and weakness. When it happens in the lower part of your back, it is called lumbar spinal stenosis. It can cause problems in the legs, feet, and rear end (buttocks). You may be able to get relief from the symptoms of spinal stenosis by taking pain medicine. Your doctor may suggest physical therapy and exercises to keep your spine strong and flexible. Some people try steroid shots to reduce swelling. If pain and numbness in your legs are still so bad that you cannot do your normal activities, you may need surgery. Follow-up care is a key part of your treatment and safety. Be sure to make and go to all appointments, and call your doctor if you are having problems. It's also a good idea to know your test results and keep a list of the medicines you take. How can you care for yourself at home? · Take an over-the-counter pain medicine. Nonsteroidal anti-inflammatory drugs (NSAIDs) such as ibuprofen or naproxen seem to work best. But if you can't take NSAIDs, you can try acetaminophen. Be safe with medicines. Read and follow all instructions on the label. · Do not take two or more pain medicines at the same time unless the doctor told you to. Many pain medicines have acetaminophen, which is Tylenol. Too much acetaminophen (Tylenol) can be harmful. · Stay at a healthy weight. Being overweight puts extra strain on your spine. · Change positions often when you sit or stand. This can ease pain. It may also reduce pressure on the spinal cord and its nerves. · Avoid doing things that make your symptoms worse.  Walking downhill and standing for a long time may cause pain. · Stretch and strengthen your back muscles as your doctor or physical therapist recommends. If your doctor says it is okay to do them, these exercises may help. ? Lie on your back with your knees bent. Gently pull one bent knee to your chest. Put that foot back on the floor, and then pull the other knee to your chest. 
? Do pelvic tilts. Lie on your back with your knees bent. Tighten your stomach muscles. Pull your belly button (navel) in and up toward your ribs. You should feel like your back is pressing to the floor and your hips and pelvis are slightly lifting off the floor. Hold for 6 seconds while breathing smoothly. ? Stand with your back flat against a wall. Slowly slide down until your knees are slightly bent. Hold for 10 seconds, then slide back up the wall. · Remove or change anything in your house that may cause you to fall. Keep walkways clear of clutter, electrical cords, and throw rugs. When should you call for help? Call 911 anytime you think you may need emergency care. For example, call if: 
  · You are unable to move a leg at all.  
[facility] your doctor now or seek immediate medical care if: 
  · You have new or worse symptoms in your legs, belly, or buttocks. Symptoms may include: 
? Numbness or tingling. ? Weakness. ? Pain.  
  · You lose bladder or bowel control.  
 Watch closely for changes in your health, and be sure to contact your doctor if: 
  · You have a fever, lose weight, or don't feel well.  
  · You are not getting better as expected. Where can you learn more? Go to http://lizy-maylin.info/. Mario Elliott in the search box to learn more about \"Lumbar Spinal Stenosis: Care Instructions. \" Current as of: June 26, 2019 Content Version: 12.2 © 3917-2675 [employer].  Care instructions adapted under license by [employer] (which disclaims liability or warranty for this information). If you have questions about a medical condition or this instruction, always ask your healthcare professional. [address] any warranty or liability for your use of this information. Sciatica: Exercises Introduction Here are some examples of typical rehabilitation exercises for your condition. Start each exercise slowly. Ease off the exercise if you start to have pain. Your doctor or physical therapist will tell you when you can start these exercises and which ones will work best for you. When you are not being active, find a comfortable position for rest. Some people are comfortable on the floor or a medium-firm bed with a small pillow under their head and another under their knees. Some people prefer to lie on their side with a pillow between their knees. Don't stay in one position for too long. Take short walks (10 to 20 minutes) every 2 to 3 hours. Avoid slopes, hills, and stairs until you feel better. Walk only distances you can manage without pain, especially leg pain. How to do the exercises Back stretches 1. Get down on your hands and knees on the floor. 2. Relax your head and allow it to droop. Round your back up toward the ceiling until you feel a nice stretch in your upper, middle, and lower back. Hold this stretch for as long as it feels comfortable, or about 15 to 30 seconds. 3. Return to the starting position with a flat back while you are on your hands and knees. 4. Let your back sway by pressing your stomach toward the floor. Lift your buttocks toward the ceiling. 5. Hold this position for 15 to 30 seconds. 6. Repeat 2 to 4 times. Follow-up care is a key part of your treatment and safety. Be sure to make and go to all appointments, and call your doctor if you are having problems. It's also a good idea to know your test results and keep a list of the medicines you take. Where can you learn more? Go to http://lizy-maylin.info/. Enter P804 in the search box to learn more about \"Sciatica: Exercises. \" Current as of: June 26, 2019 Content Version: 12.2 © 0541-9770 [employer]. Care instructions adapted under license by [employer] (which disclaims liability or warranty for this information). If you have questions about a medical condition or this instruction, always ask your healthcare professional. [place] [address] any warranty or liability for your use of this information.

## 2019-12-11 NOTE — PROGRESS NOTES
Savanna Faye is a 78 y.o. male and presents with ED Follow-up (went to [place] [address] ER on 12/1/2019 for right leg numbness and hip pain.)       Subjective:    Having right leg paresthesia and hip pain. Somewhat improved at this point. No incontinence or weakness. No injury. No fevers or chills. No night sweats. No other neurologic symptoms. Does have history of CVA. Assessment/Plan:      Diagnoses and all orders for this visit:    1. Spinal stenosis of lumbar region without neurogenic claudication-reviewed diagnosis with patient and will monitor for now but may need additional imaging or referral to pain management if not improving. Could also consider steroids if not improving  Comments:  by MRI 2011        RTC in 3 months or sooner if worsening or new symptoms occur      ROS:  Negative except as mentioned above  Cardiac- no chest pain or palpitations  Pulmonary- no sob or wheezes  GI- no n/v or diarrhea. SH:  Social History     Tobacco Use    Smoking status: Never Smoker    Smokeless tobacco: Never Used   Substance Use Topics    Alcohol use: Yes     Alcohol/week: 8.0 standard drinks     Types: 1 Cans of beer, 5 Shots of liquor per week     Comment: occasional    Drug use: No         Medications/Allergies:  Current Outpatient Medications on File Prior to Visit   Medication Sig Dispense Refill    metoprolol tartrate (LOPRESSOR) 25 mg tablet Take 25 mg by mouth two (2) times a day.  acetaminophen (TYLENOL EXTRA STRENGTH) 500 mg tablet Take 2 Tabs by mouth every six (6) hours as needed for Pain. 20 Tab 0    lidocaine (LIDODERM) 5 % Apply patch to the affected area for 12 hours a day and remove for 12 hours a day. 1 Package 0    apixaban (ELIQUIS) 2.5 mg tablet Take 2.5 mg by mouth two (2) times a day.       atorvastatin (LIPITOR) 10 mg tablet TAKE 1 TABLET BY MOUTH EVERY DAY 90 Tab 0    sertraline (ZOLOFT) 100 mg tablet TAKE 2 TABLETS BY MOUTH EVERY  Tab 1    furosemide (LASIX) 20 mg tablet Take 1 Tab by mouth daily. 30 Tab 2    cyanocobalamin (VITAMIN B-12) 500 mcg tablet Take 500 mcg by mouth daily.  tamsulosin (FLOMAX) 0.4 mg capsule TAKE 1 CAPSULE BY MOUTH EVERY DAY 1/2 HOUR FOLLOWING THE SAME MEAL EACH DAY  3    cinacalcet (SENSIPAR) 30 mg tablet Take 30 mg by mouth daily.  sevelamer carbonate (RENVELA) 800 mg tab tab Take 800 mg by mouth three (3) times daily.  sucroferric oxyhydroxide (VELPHORO) 500 mg chew chewable tablet Take  by mouth three (3) times daily (with meals).  amLODIPine (NORVASC) 10 mg tablet Take 1 Tab by mouth daily. 30 Tab 0    aspirin-dipyridamole (AGGRENOX)  mg per SR capsule TAKE 1 CAPSULE BY MOUTH TWICE A  Cap 1     No current facility-administered medications on file prior to visit. No Known Allergies    Objective:  Visit Vitals  /57   Pulse 60   Temp 95.3 °F (35.2 °C) (Oral)   Resp 16   Ht 5' 8\" (1.727 m)   Wt 185 lb (83.9 kg)   SpO2 98%   BMI 28.13 kg/m²    Body mass index is 28.13 kg/m². Constitutional: Well developed, nourished, no distress, alert   neuro  slow but normal gait, normal sensation, deep tendon reflexes 2+ bilaterally and equal in lower extremities. Normal heel and toe walking. CV: S1, S2.  RRR. No murmurs/rubs. No thrills palpated. No carotid bruits. Intact distal pulses. No edema. Pulm: No abnormalities on inspection. Clear to auscultation bilaterally. No wheezing/rhonchi. Normal effort. GI: Soft, nontender, nondistended. Normal active bowel sounds. No  masses on palpation. No hepatosplenomegaly.

## 2019-12-15 DIAGNOSIS — N18.5 STAGE 5 CHRONIC KIDNEY DISEASE (HCC): ICD-10-CM

## 2019-12-17 ENCOUNTER — PATIENT OUTREACH (OUTPATIENT)
Dept: FAMILY MEDICINE CLINIC | Age: 79
End: 2019-12-17

## 2019-12-17 RX ORDER — ASPIRIN AND DIPYRIDAMOLE 25; 200 MG/1; MG/1
CAPSULE, EXTENDED RELEASE ORAL
Qty: 180 CAP | Refills: 1 | Status: SHIPPED | OUTPATIENT
Start: 2019-12-17

## 2019-12-17 NOTE — PROGRESS NOTES
.Complex Case Management Denial       Date/Time:  2019 11:29 AM    Method of communication with patient:phone    [facility] ( [facility]) contacted the patient by telephone to perform Ambulatory Care Coordination. Verified name and  with patient as identifiers. Provided introduction to self, and explanation of the Ambulatory Care Manager's role. Reviewed most recent clinic visit w/ patient who verbalized understanding. Patient given an opportunity to ask questions. The patient Declines Care Coordination Services at this time. The patient agrees to contact the PCP office or the [address] [place] [address] for questions related to their healthcare. [facility] provided contact information for future reference.

## 2020-01-16 ENCOUNTER — OFFICE VISIT (OUTPATIENT)
Dept: FAMILY MEDICINE CLINIC | Age: 80
End: 2020-01-16

## 2020-01-16 ENCOUNTER — HOSPITAL ENCOUNTER (OUTPATIENT)
Dept: LAB | Age: 80
Discharge: HOME | End: 2020-01-16
Payer: MEDICARE

## 2020-01-16 VITALS
HEIGHT: 68 IN | OXYGEN SATURATION: 97 % | WEIGHT: 187 LBS | HEART RATE: 60 BPM | DIASTOLIC BLOOD PRESSURE: 59 MMHG | RESPIRATION RATE: 16 BRPM | TEMPERATURE: 95.8 F | BODY MASS INDEX: 28.34 KG/M2 | SYSTOLIC BLOOD PRESSURE: 114 MMHG

## 2020-01-16 DIAGNOSIS — H91.93 BILATERAL HEARING LOSS, UNSPECIFIED HEARING LOSS TYPE: Primary | ICD-10-CM

## 2020-01-16 DIAGNOSIS — Z00.00 MEDICARE ANNUAL WELLNESS VISIT, SUBSEQUENT: ICD-10-CM

## 2020-01-16 DIAGNOSIS — R41.3 MEMORY LOSS: ICD-10-CM

## 2020-01-16 DIAGNOSIS — Z13.31 SCREENING FOR DEPRESSION: ICD-10-CM

## 2020-01-16 DIAGNOSIS — Z13.39 SCREENING FOR ALCOHOLISM: ICD-10-CM

## 2020-01-16 PROBLEM — N52.1 ERECTILE DYSFUNCTION DUE TO DISEASES CLASSIFIED ELSEWHERE: Status: ACTIVE | Noted: 2020-01-16

## 2020-01-16 LAB — VIT B12 SERPL-MCNC: >2000 PG/ML (ref 211–911)

## 2020-01-16 PROCEDURE — 36415 COLL VENOUS BLD VENIPUNCTURE: CPT

## 2020-01-16 PROCEDURE — 82607 VITAMIN B-12: CPT

## 2020-01-16 RX ORDER — SILDENAFIL 50 MG/1
50 TABLET, FILM COATED ORAL AS NEEDED
Qty: 10 TAB | Refills: 2 | Status: SHIPPED | OUTPATIENT
Start: 2020-01-16

## 2020-01-16 NOTE — PROGRESS NOTES
This is the Subsequent Medicare Annual Wellness Exam, performed 12 months or more after the Initial AWV or the last Subsequent AWV    I have reviewed the patient's medical history in detail and updated the computerized patient record. History     Patient Active Problem List   Diagnosis Code    Hypertensive disorder I10    History of CVA (cerebrovascular accident) Z80.78    Prostate cancer ([facility] C61    Cerebrovascular accident ([facility] I63.9    Cervical strain S16. 1XXA    Degenerative disc disease, cervical M50.30    Cellulitis of right lower extremity L03.115    ESRD (end stage renal disease) on dialysis (HCC) N18.6, Z99.2    Hyperlipidemia E78.5    Macrocytic anemia D53.9    Alcohol abuse F10.10    Uncontrolled hypertension I10    Spinal stenosis M48.00    Carcinoma of prostate ([facility] C61    Stage 4 chronic kidney disease ([facility] N18.4     Past Medical History:   Diagnosis Date    Arthritis     Cancer ([facility]     Chronic kidney disease     dialysis    Community acquired pneumonia     Decreased exercise tolerance     Depression     Dialysis patient ([facility]     Difficulty urinating     Dizziness     Hypercholesteremia     Hypercholesterolemia     Hypertension     Other malignant neoplasm without specification of site     Prostate CA ([facility] 2007    s/p combined radiation therapy    Prostate cancer ([facility]     Renal failure     Stroke ([facility]     Urinary frequency       Past Surgical History:   Procedure Laterality Date    HX COLONOSCOPY  2015    HX HEENT  cataract sx    HX HEENT      Cataract surgery 2002    HX OTHER SURGICAL  09/10/2014    DIALYSIS FISTULA OR GRAFT    HX OTHER SURGICAL  07/20/2015    VENOUS ACCESS CATHETER INSERTION    HX OTHER SURGICAL  11/03/2016    VENOUS ACCESS CATHETER INSERTION    HX OTHER SURGICAL  11/04/2016    ARTERIOVENOUS FISTULA REVISION    HX UROLOGICAL  2007    INSERTION RADIOACTIVE SEEDS     Current Outpatient Medications   Medication Sig Dispense Refill  atorvastatin (LIPITOR) 10 mg tablet TAKE 1 TABLET BY MOUTH EVERY DAY 90 Tab 1    aspirin-dipyridamole (AGGRENOX)  mg per SR capsule TAKE 1 CAPSULE BY MOUTH TWICE A  Cap 1    metoprolol tartrate (LOPRESSOR) 25 mg tablet Take 25 mg by mouth two (2) times a day.  acetaminophen (TYLENOL EXTRA STRENGTH) 500 mg tablet Take 2 Tabs by mouth every six (6) hours as needed for Pain. 20 Tab 0    lidocaine (LIDODERM) 5 % Apply patch to the affected area for 12 hours a day and remove for 12 hours a day. 1 Package 0    sucroferric oxyhydroxide (VELPHORO) 500 mg chew chewable tablet Take  by mouth three (3) times daily (with meals).  apixaban (ELIQUIS) 2.5 mg tablet Take 2.5 mg by mouth two (2) times a day.  sertraline (ZOLOFT) 100 mg tablet TAKE 2 TABLETS BY MOUTH EVERY  Tab 1    amLODIPine (NORVASC) 10 mg tablet Take 1 Tab by mouth daily. 30 Tab 0    furosemide (LASIX) 20 mg tablet Take 1 Tab by mouth daily. 30 Tab 2    cyanocobalamin (VITAMIN B-12) 500 mcg tablet Take 500 mcg by mouth daily.  tamsulosin (FLOMAX) 0.4 mg capsule TAKE 1 CAPSULE BY MOUTH EVERY DAY 1/2 HOUR FOLLOWING THE SAME MEAL EACH DAY  3    cinacalcet (SENSIPAR) 30 mg tablet Take 30 mg by mouth daily.  sevelamer carbonate (RENVELA) 800 mg tab tab Take 800 mg by mouth three (3) times daily. No Known Allergies    No family history on file. Social History     Tobacco Use    Smoking status: Never Smoker    Smokeless tobacco: Never Used   Substance Use Topics    Alcohol use: Yes     Alcohol/week: 8.0 standard drinks     Types: 1 Cans of beer, 5 Shots of liquor per week     Comment: occasional       Depression Risk Factor Screening:     3 most recent PHQ Screens 12/11/2019   PHQ Not Done -   Little interest or pleasure in doing things Several days   Feeling down, depressed, irritable, or hopeless Several days   Total Score PHQ 2 2       Alcohol Risk Factor Screening (MALE > 65):    Do you average more 1 drink per night or more than 7 drinks a week: yes    In the past three months have you have had more than 4 drinks containing alcohol on one occasion: yes      Functional Ability and Level of Safety:   Hearing: The patient needs further evaluation. Activities of Daily Living: The home contains: handrails and grab bars  Patient does total self care    Ambulation: with no difficulty    Fall Risk:  Fall Risk Assessment, last 12 mths 1/16/2020   Able to walk? Yes   Fall in past 12 months? No   Fall with injury? -   Number of falls in past 12 months -   Fall Risk Score -       Abuse Screen:  Patient is not abused    Cognitive Screening   Has your family/caregiver stated any concerns about your memory: no  Cognitive Screening: Abnormal - Clock Drawing Test    Patient Care Team   Patient Care Team:  Bhanu Garrison MD as PCP - General (Internal Medicine)  Bhanu Garrison MD as PCP - [facility] Provider  Julia Elias MD (Internal Medicine)  Sully Alanis MD (Ophthalmology)  Sherley Parson MD (Nephrology)  Jass Harrington MD ([address] [place] [address] Vascular Surgery)  Obinna Leija RN as Care Transitions Nurse    Assessment/Plan   Education and counseling provided:  Are appropriate based on today's review and evaluation  End-of-Life planning (with patient's consent)   shingrix given. Diagnoses and all orders for this visit:    1. Medicare annual wellness visit, subsequent    2. Screening for alcoholism    3. Screening for depression        Health Maintenance Due   Topic Date Due    DTaP/Tdap/Td series (1 - Tdap) 06/15/1951    Shingrix Vaccine Age 50> (2 of 2) 08/08/2019    MEDICARE YEARLY EXAM  01/16/2020     Memory loss- abnormal clock draw- normal TSH, CT showed infarctions and heavy EtoH abuse are likely etiology- will do B12 and MOCA testing.      Encouraged complete etOH cessation discussed - pt states he has not had any withdrawl symptoms in the past.   Patient adds he has erectile dysfunction and was given Viagra from his cardiologist with good results he would like a refill of it today.

## 2020-01-16 NOTE — ACP (ADVANCE CARE PLANNING)
Advance Care Planning (ACP) Provider Conversation Snapshot    Date of ACP Conversation: 01/16/20  Persons included in Conversation:  patient  Length of ACP Conversation in minutes:  <16 minutes (Non-Billable)    Authorized Decision Maker (if patient is incapable of making informed decisions): This person is: Other Legally Authorized Decision Maker (e.g. Next of Kin)            For Patients with Decision Making Capacity:   Values/Goals: Exploration of values, goals, and preferences if recovery is not expected, even with continued medical treatment in the event of:  Imminent death  Severe, permanent brain injury  . \"In these circumstances, what matters most to you? \"  Care focused more on comfort or quality of life.   Lux Marx Outcomes / Follow-Up Plan:   Recommended completion of Advance Directive form after review of ACP materials and conversation with prospective healthcare agent

## 2020-01-16 NOTE — PROGRESS NOTES
1. Have you been to the ER, urgent care clinic since your last visit? Hospitalized since your last visit? No.     2. Have you seen or consulted any other health care providers outside of the [address] [place] [address] since your last visit? Include any pap smears or colon screening.  No.    Chief Complaint   Patient presents with   [facility] Wellness Visit

## 2020-01-16 NOTE — PATIENT INSTRUCTIONS
Medicare Wellness Visit, Male The best way to live healthy is to have a lifestyle where you eat a well-balanced diet, exercise regularly, limit alcohol use, and quit all forms of tobacco/nicotine, if applicable. Regular preventive services are another way to keep healthy. Preventive services (vaccines, screening tests, monitoring & exams) can help personalize your care plan, which helps you manage your own care. Screening tests can find health problems at the earliest stages, when they are easiest to treat. [facility] follows the current, evidence-based guidelines published by the [facility] ([facility]) when recommending preventive services for our patients. Because we follow these guidelines, sometimes recommendations change over time as research supports it. (For example, a prostate screening blood test is no longer routinely recommended for men with no symptoms). Of course, you and your doctor may decide to screen more often for some diseases, based on your risk and co-morbidities (chronic disease you are already diagnosed with). Preventive services for you include: - Medicare offers their members a free annual wellness visit, which is time for you and your primary care provider to discuss and plan for your preventive service needs. Take advantage of this benefit every year! 
-All adults over age 72 should receive the recommended pneumonia vaccines. Current USPSTF guidelines recommend a series of two vaccines for the best pneumonia protection.  
-All adults should have a flu vaccine yearly and tetanus vaccine every 10 years. 
-All adults age 48 and older should receive the shingles vaccines (series of two vaccines).       
-All adults age 38-68 who are overweight should have a diabetes screening test once every three years.  
-Other screening tests & preventive services for persons with diabetes include: an eye exam to screen for diabetic retinopathy, a kidney function test, a foot exam, and stricter control over your cholesterol.  
-Cardiovascular screening for adults with routine risk involves an electrocardiogram (ECG) at intervals determined by the provider.  
-Colorectal cancer screening should be done for adults age 54-65 with no increased risk factors for colorectal cancer. There are a number of acceptable methods of screening for this type of cancer. Each test has its own benefits and drawbacks. Discuss with your provider what is most appropriate for you during your annual wellness visit. The different tests include: colonoscopy (considered the best screening method), a fecal occult blood test, a fecal DNA test, and sigmoidoscopy. 
-All adults born between [facility] should be screened once for Hepatitis C. 
-An Abdominal Aortic Aneurysm (AAA) Screening is recommended for men age 73-68 who has ever smoked in their lifetime. Here is a list of your current Health Maintenance items (your personalized list of preventive services) with a due date: 
Health Maintenance Due Topic Date Due  
 DTaP/Tdap/Td  (1 - Tdap) [birth year]1949  Shingles Vaccine (2 of 2) 08/08/2019 [facility] Annual Well Visit  01/16/2020 Well Visit, Over 72: Care Instructions Your Care Instructions Physical exams can help you stay healthy. Your doctor has checked your overall health and may have suggested ways to take good care of yourself. He or she also may have recommended tests. At home, you can help prevent illness with healthy eating, regular exercise, and other steps. Follow-up care is a key part of your treatment and safety. Be sure to make and go to all appointments, and call your doctor if you are having problems. It's also a good idea to know your test results and keep a list of the medicines you take. How can you care for yourself at home? · Reach and stay at a healthy weight. This will lower your risk for many problems, such as obesity, diabetes, heart disease, and high blood pressure. · Get at least 30 minutes of exercise on most days of the week. Walking is a good choice. You also may want to do other activities, such as running, swimming, cycling, or playing tennis or team sports. · Do not smoke. Smoking can make health problems worse. If you need help quitting, talk to your doctor about stop-smoking programs and medicines. These can increase your chances of quitting for good. · Protect your skin from too much sun. When you're outdoors from 10 a.m. to 4 p.m., stay in the shade or cover up with clothing and a hat with a wide brim. Wear sunglasses that block UV rays. Even when it's cloudy, put broad-spectrum sunscreen (SPF 30 or higher) on any exposed skin. · See a dentist one or two times a year for checkups and to have your teeth cleaned. · Wear a seat belt in the car. Follow your doctor's advice about when to have certain tests. These tests can spot problems early. For men and women · Cholesterol. Your doctor will tell you how often to have this done based on your overall health and other things that can increase your risk for heart attack and stroke. · Blood pressure. Have your blood pressure checked during a routine doctor visit. Your doctor will tell you how often to check your blood pressure based on your age, your blood pressure results, and other factors. · Diabetes. Ask your doctor whether you should have tests for diabetes. · Vision. Experts recommend that you have yearly exams for glaucoma and other age-related eye problems. · Hearing. Tell your doctor if you notice any change in your hearing. You can have tests to find out how well you hear. · Colon cancer tests. Keep having colon cancer tests as your doctor recommends. You can have one of several types of tests. · Heart attack and stroke risk. At least every 4 to 6 years, you should have your risk for heart attack and stroke assessed. Your doctor uses factors such as your age, blood pressure, cholesterol, and whether you smoke or have diabetes to show what your risk for a heart attack or stroke is over the next 10 years. · Osteoporosis. Talk to your doctor about whether you should have a bone density test to find out whether you have thinning bones. Also ask your doctor about whether you should take calcium and vitamin D supplements. For women · Pap test and pelvic exam. You may no longer need a Pap test. Talk with your doctor about whether to stop or continue to have Pap tests. · Breast exam and mammogram. Ask how often you should have a mammogram, which is an X-ray of your breasts. A mammogram can spot breast cancer before it can be felt and when it is easiest to treat. · Thyroid disease. Talk to your doctor about whether to have your thyroid checked as part of a regular physical exam. Women have an increased chance of a thyroid problem. For men · Prostate exam. Talk to your doctor about whether you should have a blood test (called a PSA test) for prostate cancer. Experts recommend that you discuss the benefits and risks of the test with your doctor before you decide whether to have this test. Some experts say that men ages 79 and older no longer need testing. · Abdominal aortic aneurysm. Ask your doctor whether you should have a test to check for an aneurysm. You may need a test if you ever smoked or if your parent, brother, sister, or child has had an aneurysm. When should you call for help? Watch closely for changes in your health, and be sure to contact your doctor if you have any problems or symptoms that concern you. Where can you learn more? Go to http://lizy-maylin.info/. Enter F718 in the search box to learn more about \"Well Visit, Over 65: Care Instructions. \" 
 Current as of: December 13, 2018 Content Version: 12.2 © 6103-8618 [employer]. Care instructions adapted under license by [employer] (which disclaims liability or warranty for this information). If you have questions about a medical condition or this instruction, always ask your healthcare professional. [address] any warranty or liability for your use of this information.

## 2020-03-12 DIAGNOSIS — F33.41 RECURRENT MAJOR DEPRESSIVE DISORDER, IN PARTIAL REMISSION (HCC): ICD-10-CM

## 2020-03-16 RX ORDER — AMLODIPINE BESYLATE 5 MG/1
TABLET ORAL
Qty: 90 TAB | Refills: 1 | Status: SHIPPED | OUTPATIENT
Start: 2020-03-16

## 2020-03-24 ENCOUNTER — TELEPHONE (OUTPATIENT)
Dept: FAMILY MEDICINE CLINIC | Age: 80
End: 2020-03-24

## 2020-03-24 NOTE — TELEPHONE ENCOUNTER
I called patient and spoke to the wife Medardo Bennett and to see if we can reschedule his appointment this coming Thursday 3/26/2020 to July due to Covid-19 outbreak. She agreed but aware if he needs any refills or has questions and concerns to call our office.

## 2020-03-27 DIAGNOSIS — F33.41 RECURRENT MAJOR DEPRESSIVE DISORDER, IN PARTIAL REMISSION (HCC): ICD-10-CM

## 2020-03-27 RX ORDER — SERTRALINE HYDROCHLORIDE 100 MG/1
TABLET, FILM COATED ORAL
Qty: 180 TAB | Refills: 1 | Status: SHIPPED | OUTPATIENT
Start: 2020-03-27 | End: 2020-09-24

## 2020-07-02 RX ORDER — ATORVASTATIN CALCIUM 10 MG/1
TABLET, FILM COATED ORAL
Qty: 90 TAB | Refills: 1 | Status: SHIPPED | OUTPATIENT
Start: 2020-07-02 | End: 2020-11-30

## 2020-09-11 ENCOUNTER — OFFICE VISIT (OUTPATIENT)
Dept: FAMILY MEDICINE CLINIC | Age: 80
End: 2020-09-11

## 2020-09-11 VITALS
BODY MASS INDEX: 28.98 KG/M2 | RESPIRATION RATE: 16 BRPM | DIASTOLIC BLOOD PRESSURE: 70 MMHG | HEIGHT: 68 IN | TEMPERATURE: 97.6 F | WEIGHT: 191.2 LBS | HEART RATE: 66 BPM | OXYGEN SATURATION: 97 % | SYSTOLIC BLOOD PRESSURE: 150 MMHG

## 2020-09-11 DIAGNOSIS — F01.50 MULTI-INFARCT DEMENTIA WITHOUT BEHAVIORAL DISTURBANCE (HCC): ICD-10-CM

## 2020-09-11 DIAGNOSIS — H91.90 HEARING LOSS, UNSPECIFIED HEARING LOSS TYPE, UNSPECIFIED LATERALITY: Primary | ICD-10-CM

## 2020-09-11 DIAGNOSIS — I63.9 OCCIPITAL CEREBRAL INFARCTION (HCC): ICD-10-CM

## 2020-09-11 NOTE — PROGRESS NOTES
1. Have you been to the ER, urgent care clinic since your last visit? Hospitalized since your last visit? No.     2. Have you seen or consulted any other health care providers outside of the [address] [place] [address] since your last visit? Include any pap smears or colon screening.  No.     Chief Complaint   Patient presents with    Memory Loss     Need MOCA test

## 2020-09-11 NOTE — PROGRESS NOTES
Michelle Malagon is a 78 y.o. male and presents with Memory Loss (Need MOCA test)       Subjective:    Memory loss- chronic- struggles with remembering many daily things- meds/appointments/etc.  Has h/o CVA. Lost part of his vision at that point. Also has hearing loss. Had evaluation 8/22/19- hearing aid recommended but pt unsure what happened after that. Assessment/Plan:      Diagnoses and all orders for this visit:    1. Hearing loss, unspecified hearing loss type, unspecified laterality  -     REFERRAL TO AUDIOLOGY    2. 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 -     MRI BRAIN WO CONT; Future  -     REFERRAL TO NEUROPSYCHOLOGY    3. Occipital cerebral infarction [facility])- will do short f/u- continue medications. bp is slightly elevated today- encouraged strict compliance      CT HEAD WO CONT (Accession [id number]) (Order [id number])   Allergies      No Known Allergies    Exam Information     Status  Exam Begun   Exam Ended     Final [99]  9/15/2015  13:00  9/15/2015  13:05    Result Information     Status: Final result (Exam End: 9/15/2015 13:05)  Provider Status: Open    Study Result     CT head without contrast.     Indication: Dizziness for one week.     Technique:     Serial axial images were obtained from the cranial base to the vertex without  intravenous contrast administration.      Comparison: None.     Findings:     The ventricular size and configuration are normal for age demonstrating mild  diffuse atrophy without midline shift or mass effect. Nonspecific mild  hypodensities in the periventricular and subcortical white matter. Left  occipital encephalomalacia consistent with chronic infarct. Hypodensity in the  left basal ganglia suggestive of lacunar infarct. There is no evidence of any  hemorrhage, acute cortical infarct, or abnormal fluid collection. The bony  structures appear intact. Intracranial carotid atherosclerotic calcifications  noted. Paranasal sinuses are clear. Mastoid air cells appear clear.      IMPRESSION:     1. There is mild diffuse atrophy and likely mild chronic microvascular disease. Chronic left occipital infarct and likely lacunar infarct in the left basal  ganglia.     2. There is no evidence of any hemorrhage, acute cortical infarct, or mass  effect. CT may be negative for infarctions in acute setting. RTC in   Orders Placed This Encounter    MRI BRAIN WO CONT     Standing Status:   Future     Standing Expiration Date:   10/11/2021    REFERRAL TO AUDIOLOGY     Referral Priority:   Routine     Referral Type:   Audiology Services     Referral Reason:   Specialty Services Required     Number of Visits Requested:   1    REFERRAL TO NEUROPSYCHOLOGY     Referral Priority:   Routine     Referral Type:   Consultation     Referral Reason:   Specialty Services Required     Number of Visits Requested:   1         ROS:  Negative except as mentioned above  Cardiac- no chest pain or palpitations  Pulmonary- no sob or wheezes  GI- no n/v or diarrhea. SH:  Social History     Tobacco Use    Smoking status: Never Smoker    Smokeless tobacco: Never Used   Substance Use Topics    Alcohol use: Yes     Alcohol/week: 8.0 standard drinks     Types: 1 Cans of beer, 5 Shots of liquor per week     Comment: occasional    Drug use: No         Medications/Allergies:  Current Outpatient Medications on File Prior to Visit   Medication Sig Dispense Refill    atorvastatin (LIPITOR) 10 mg tablet TAKE 1 TABLET BY MOUTH EVERY DAY 90 Tab 1    sertraline (ZOLOFT) 100 mg tablet TAKE 2 TABLETS BY MOUTH EVERY  Tab 1    sucroferric oxyhydroxide (VELPHORO) 500 mg chew chewable tablet Take  by mouth three (3) times daily (with meals).  apixaban (ELIQUIS) 2.5 mg tablet Take 2.5 mg by mouth two (2) times a day.  furosemide (LASIX) 20 mg tablet Take 1 Tab by mouth daily.  30 Tab 2    cyanocobalamin (VITAMIN B-12) 500 mcg tablet Take 1,000 mcg by mouth daily.  tamsulosin (FLOMAX) 0.4 mg capsule TAKE 1 CAPSULE BY MOUTH EVERY DAY 1/2 HOUR FOLLOWING THE SAME MEAL EACH DAY  3    amLODIPine (NORVASC) 5 mg tablet TAKE 1 TABLET BY MOUTH EVERY DAY 90 Tab 1    sildenafil citrate (VIAGRA) 50 mg tablet Take 1 Tab by mouth as needed for Erectile Dysfunction. 10 Tab 2    aspirin-dipyridamole (AGGRENOX)  mg per SR capsule TAKE 1 CAPSULE BY MOUTH TWICE A  Cap 1    metoprolol tartrate (LOPRESSOR) 25 mg tablet Take 25 mg by mouth two (2) times a day.  acetaminophen (TYLENOL EXTRA STRENGTH) 500 mg tablet Take 2 Tabs by mouth every six (6) hours as needed for Pain. 20 Tab 0    lidocaine (LIDODERM) 5 % Apply patch to the affected area for 12 hours a day and remove for 12 hours a day. 1 Package 0    amLODIPine (NORVASC) 10 mg tablet Take 1 Tab by mouth daily. 30 Tab 0    cinacalcet (SENSIPAR) 30 mg tablet Take 30 mg by mouth daily.  sevelamer carbonate (RENVELA) 800 mg tab tab Take 800 mg by mouth three (3) times daily. No current facility-administered medications on file prior to visit. No Known Allergies    Objective:  Visit Vitals  /70   Pulse 66   Temp 97.6 °F (36.4 °C) (Oral)   Resp 16   Ht 5' 8\" (1.727 m)   Wt 191 lb 3.2 oz (86.7 kg)   SpO2 97%   BMI 29.07 kg/m²    Body mass index is 29.07 kg/m². Constitutional: Well developed, nourished, no distress, alert   HENT: Exterior ears and tympanic membranes normal bilaterally. Supple neck. No thyromegaly or lymphadenopathy. Oropharynx clear and moist mucous membranes. Eyes: Conjunctiva normal. PERRL. CV: S1, S2.  RRR. No murmurs/rubs. No thrills palpated. No carotid bruits. Intact distal pulses. No edema. Pulm: No abnormalities on inspection. Clear to auscultation bilaterally. No wheezing/rhonchi. Normal effort. GI: Soft, nontender, nondistended. Normal active bowel sounds.  No  masses on palpation. No hepatosplenomegaly.

## 2020-09-11 NOTE — PATIENT INSTRUCTIONS
Please be sure to call us if you have any difficulty scheduling the MRI, the referral to neuropsychology for memory testing, or the follow-up with cardiology as they indicated you would benefit from a hearing aid.

## 2020-09-14 ENCOUNTER — HOSPITAL ENCOUNTER (EMERGENCY)
Age: 80
Discharge: HOME | End: 2020-09-14
Attending: EMERGENCY MEDICINE | Admitting: EMERGENCY MEDICINE
Payer: MEDICARE

## 2020-09-14 ENCOUNTER — APPOINTMENT (OUTPATIENT)
Dept: GENERAL RADIOLOGY | Age: 80
End: 2020-09-14
Attending: PHYSICIAN ASSISTANT
Payer: MEDICARE

## 2020-09-14 VITALS
WEIGHT: 185 LBS | OXYGEN SATURATION: 98 % | HEART RATE: 71 BPM | TEMPERATURE: 97.6 F | BODY MASS INDEX: 28.04 KG/M2 | SYSTOLIC BLOOD PRESSURE: 196 MMHG | DIASTOLIC BLOOD PRESSURE: 82 MMHG | HEIGHT: 68 IN | RESPIRATION RATE: 18 BRPM

## 2020-09-14 DIAGNOSIS — N18.9 CHRONIC KIDNEY DISEASE, UNSPECIFIED CKD STAGE: Primary | ICD-10-CM

## 2020-09-14 LAB
ALBUMIN SERPL-MCNC: 3.3 G/DL (ref 3.4–5)
ALBUMIN/GLOB SERPL: 0.7 {RATIO} (ref 0.8–1.7)
ALP SERPL-CCNC: 105 U/L (ref 45–117)
ALT SERPL-CCNC: 23 U/L (ref 16–61)
ANION GAP SERPL CALC-SCNC: 10 MMOL/L (ref 3–18)
AST SERPL-CCNC: 15 U/L (ref 10–38)
BASOPHILS # BLD: 0 K/UL (ref 0–0.1)
BASOPHILS NFR BLD: 0 % (ref 0–2)
BILIRUB DIRECT SERPL-MCNC: 0.2 MG/DL (ref 0–0.2)
BILIRUB SERPL-MCNC: 0.5 MG/DL (ref 0.2–1)
BNP SERPL-MCNC: ABNORMAL PG/ML (ref 0–1800)
BUN SERPL-MCNC: 60 MG/DL (ref 7–18)
BUN/CREAT SERPL: 4 (ref 12–20)
CALCIUM SERPL-MCNC: 9 MG/DL (ref 8.5–10.1)
CHLORIDE SERPL-SCNC: 98 MMOL/L (ref 100–111)
CO2 SERPL-SCNC: 26 MMOL/L (ref 21–32)
CREAT SERPL-MCNC: 14.3 MG/DL (ref 0.6–1.3)
CRP SERPL HS-MCNC: >9.5 MG/L
D DIMER PPP FEU-MCNC: 1.28 UG/ML(FEU)
DIFFERENTIAL METHOD BLD: ABNORMAL
EOSINOPHIL # BLD: 0.2 K/UL (ref 0–0.4)
EOSINOPHIL NFR BLD: 3 % (ref 0–5)
ERYTHROCYTE [DISTWIDTH] IN BLOOD BY AUTOMATED COUNT: 16.7 % (ref 11.6–14.5)
FERRITIN SERPL-MCNC: 1469 NG/ML (ref 8–388)
GLOBULIN SER CALC-MCNC: 4.5 G/DL (ref 2–4)
GLUCOSE SERPL-MCNC: 101 MG/DL (ref 74–99)
HCT VFR BLD AUTO: 33.3 % (ref 36–48)
HGB BLD-MCNC: 11.2 G/DL (ref 13–16)
INR PPP: 1.2 (ref 0.8–1.2)
LACTATE BLD-SCNC: 0.87 MMOL/L (ref 0.4–2)
LDH SERPL L TO P-CCNC: 188 U/L (ref 81–234)
LYMPHOCYTES # BLD: 1.1 K/UL (ref 0.9–3.6)
LYMPHOCYTES NFR BLD: 17 % (ref 21–52)
MAGNESIUM SERPL-MCNC: 3.1 MG/DL (ref 1.6–2.6)
MCH RBC QN AUTO: 34.4 PG (ref 24–34)
MCHC RBC AUTO-ENTMCNC: 33.6 G/DL (ref 31–37)
MCV RBC AUTO: 102.1 FL (ref 74–97)
MONOCYTES # BLD: 0.9 K/UL (ref 0.05–1.2)
MONOCYTES NFR BLD: 14 % (ref 3–10)
NEUTS SEG # BLD: 4.2 K/UL (ref 1.8–8)
NEUTS SEG NFR BLD: 66 % (ref 40–73)
PLATELET # BLD AUTO: 175 K/UL (ref 135–420)
PMV BLD AUTO: 10.7 FL (ref 9.2–11.8)
POTASSIUM SERPL-SCNC: 4.2 MMOL/L (ref 3.5–5.5)
PROT SERPL-MCNC: 7.8 G/DL (ref 6.4–8.2)
PROTHROMBIN TIME: 14.6 SEC (ref 11.5–15.2)
RBC # BLD AUTO: 3.26 M/UL (ref 4.7–5.5)
SODIUM SERPL-SCNC: 134 MMOL/L (ref 136–145)
TROPONIN I SERPL-MCNC: 0.14 NG/ML (ref 0–0.04)
WBC # BLD AUTO: 6.4 K/UL (ref 4.6–13.2)

## 2020-09-14 PROCEDURE — 83605 ASSAY OF LACTIC ACID: CPT

## 2020-09-14 PROCEDURE — 87040 BLOOD CULTURE FOR BACTERIA: CPT

## 2020-09-14 PROCEDURE — 99285 EMERGENCY DEPT VISIT HI MDM: CPT

## 2020-09-14 PROCEDURE — 85025 COMPLETE CBC W/AUTO DIFF WBC: CPT

## 2020-09-14 PROCEDURE — 82728 ASSAY OF FERRITIN: CPT

## 2020-09-14 PROCEDURE — 86141 C-REACTIVE PROTEIN HS: CPT

## 2020-09-14 PROCEDURE — 83735 ASSAY OF MAGNESIUM: CPT

## 2020-09-14 PROCEDURE — 85379 FIBRIN DEGRADATION QUANT: CPT

## 2020-09-14 PROCEDURE — 87635 SARS-COV-2 COVID-19 AMP PRB: CPT

## 2020-09-14 PROCEDURE — 84484 ASSAY OF TROPONIN QUANT: CPT

## 2020-09-14 PROCEDURE — 85610 PROTHROMBIN TIME: CPT

## 2020-09-14 PROCEDURE — 83880 ASSAY OF NATRIURETIC PEPTIDE: CPT

## 2020-09-14 PROCEDURE — 80076 HEPATIC FUNCTION PANEL: CPT

## 2020-09-14 PROCEDURE — 71045 X-RAY EXAM CHEST 1 VIEW: CPT

## 2020-09-14 PROCEDURE — 94762 N-INVAS EAR/PLS OXIMTRY CONT: CPT

## 2020-09-14 PROCEDURE — 96374 THER/PROPH/DIAG INJ IV PUSH: CPT

## 2020-09-14 PROCEDURE — 80048 BASIC METABOLIC PNL TOTAL CA: CPT

## 2020-09-14 PROCEDURE — 74011250636 HC RX REV CODE- 250/636: Performed by: EMERGENCY MEDICINE

## 2020-09-14 PROCEDURE — 93005 ELECTROCARDIOGRAM TRACING: CPT

## 2020-09-14 PROCEDURE — 83615 LACTATE (LD) (LDH) ENZYME: CPT

## 2020-09-14 RX ORDER — FUROSEMIDE 10 MG/ML
40 INJECTION INTRAMUSCULAR; INTRAVENOUS
Status: COMPLETED | OUTPATIENT
Start: 2020-09-14 | End: 2020-09-14

## 2020-09-14 RX ADMIN — FUROSEMIDE 40 MG: 10 INJECTION, SOLUTION INTRAMUSCULAR; INTRAVENOUS at 18:40

## 2020-09-14 NOTE — ED PROVIDER NOTES
Patient arrives via family dropped off complains of shortness of breath with exertion for the past few days and a cough patient has a history of dialysis diabetes high blood pressure he does take some Lasix but he does not have a history of heart failure denies fevers chills nausea vomiting chest pain. This note dictated in dragon software. There may be grammatical and spelling errors that are missed during review    Review of systems: all other systems negative unless otherwise specified      The history is provided by the patient. Cough   This is a new problem. The current episode started more than 2 days ago. The problem occurs constantly. The problem has not changed since onset. The cough is non-productive. There has been no fever. Associated symptoms include shortness of breath. Pertinent negatives include no chest pain, no chills, no rhinorrhea, no wheezing, no nausea and no vomiting. He has tried nothing for the symptoms. His past medical history does not include heart failure. Shortness of Breath   This is a new problem. The current episode started more than 2 days ago. The problem has not changed since onset. Associated symptoms include cough. Pertinent negatives include no fever, no rhinorrhea, no sputum production, no wheezing, no PND, no orthopnea, no chest pain, no vomiting, no abdominal pain, no leg pain and no leg swelling. He has tried nothing for the symptoms. Associated medical issues do not include heart failure.         Past Medical History:   Diagnosis Date    Arthritis     Cancer ([facility] [address]     Chronic kidney disease     dialysis    Community acquired pneumonia     Decreased exercise tolerance     Depression     Dialysis patient ([facility] [address]     Difficulty urinating     Dizziness     Hypercholesteremia     Hypercholesterolemia     Hypertension     Other malignant neoplasm without specification of site     Prostate CA ([address] 2007    s/p combined radiation therapy    Prostate cancer ([facility] [address]  Renal failure     Stroke [facility])     Urinary frequency        Past Surgical History:   Procedure Laterality Date    HX COLONOSCOPY  2015    HX HEENT  cataract sx    HX HEENT      Cataract surgery 2002    HX OTHER SURGICAL  09/10/2014    DIALYSIS FISTULA OR GRAFT    HX OTHER SURGICAL  07/20/2015    VENOUS ACCESS CATHETER INSERTION    HX OTHER SURGICAL  11/03/2016    VENOUS ACCESS CATHETER INSERTION    HX OTHER SURGICAL  11/04/2016    ARTERIOVENOUS FISTULA REVISION    HX UROLOGICAL  2007    INSERTION RADIOACTIVE SEEDS         No family history on file. Social History     Socioeconomic History    Marital status:      Spouse name: Not on file    Number of children: Not on file    Years of education: Not on file    Highest education level: Not on file   Occupational History    Not on file   Social Needs    Financial resource strain: Not on file    Food insecurity     Worry: Not on file     Inability: Not on file    Transportation needs     Medical: Not on file     Non-medical: Not on file   Tobacco Use    Smoking status: Never Smoker    Smokeless tobacco: Never Used   Substance and Sexual Activity    Alcohol use:  Yes     Alcohol/week: 8.0 standard drinks     Types: 1 Cans of beer, 5 Shots of liquor per week     Comment: occasional    Drug use: No    Sexual activity: Never     Partners: Female     Comment: E.D. SP CAP/XRT   Lifestyle    Physical activity     Days per week: Not on file     Minutes per session: Not on file    Stress: Not on file   Relationships    Social connections     Talks on phone: Not on file     Gets together: Not on file     Attends Anabaptism service: Not on file     Active member of club or organization: Not on file     Attends meetings of clubs or organizations: Not on file     Relationship status: Not on file    Intimate partner violence     Fear of current or ex partner: Not on file     Emotionally abused: Not on file     Physically abused: Not on file Forced sexual activity: Not on file   Other Topics Concern    Not on file   Social History Narrative    ** Merged History Encounter **              ALLERGIES: Patient has no known allergies. Review of Systems   Constitutional: Negative for chills and fever. HENT: Negative for rhinorrhea. Respiratory: Positive for cough and shortness of breath. Negative for sputum production and wheezing. Cardiovascular: Negative for chest pain, orthopnea, leg swelling and PND. Gastrointestinal: Negative for abdominal pain, nausea and vomiting. Genitourinary: Negative for dysuria. Neurological: Negative for light-headedness. All other systems reviewed and are negative. Vitals:    09/14/20 1701   Resp: 18   Weight: 83.9 kg (185 lb)   Height: 5' 8\" (1.727 m)            Physical Exam  Vitals signs and nursing note reviewed. Constitutional:       General: He is not in acute distress. Appearance: Normal appearance. He is not ill-appearing, toxic-appearing or diaphoretic. HENT:      Head: Normocephalic. Eyes:      Extraocular Movements: Extraocular movements intact. Neck:      Musculoskeletal: Normal range of motion. Cardiovascular:      Rate and Rhythm: Normal rate and regular rhythm. Pulmonary:      Effort: Pulmonary effort is normal. No respiratory distress. Breath sounds: Normal breath sounds. No wheezing or rales. Abdominal:      General: Abdomen is flat. Bowel sounds are normal.      Palpations: Abdomen is soft. Tenderness: There is no abdominal tenderness. Musculoskeletal:      Right lower leg: No edema. Left lower leg: No edema. Skin:     General: Skin is warm. Findings: No lesion. Neurological:      Mental Status: He is alert. MDM  Number of Diagnoses or Management Options  Chronic kidney disease, unspecified CKD stage:   Diagnosis management comments:  This is a well-appearing 68-year-old male with chief complaint of cough and shortness of breath mainly with exertion he appears well at bedside is initial oxygen saturations in triage were in the high 80s currently is 94% on room air patient is very awake and alert with no real complaints other than shortness of breath check appropriate blood work EKG chest x-ray    Elevation of BNP and troponin new onset a flutter will need admission for further evaluation and management patient is well-appearing with no O2 requirement once in the back of the ER. EKG shows atrial flutter at a rate of 75 normal axis no obvious ischemia or ectopy. Old EKGs reviewed. Old records reviewed. Patient 900% off oxygen discussed with him blood work labs including heart enzymes he has dialysis scheduled for tomorrow at 930 does not wish to stay here for any further evaluation or testing or dialysis tonight states he will go to dialysis tomorrow return to the ER should symptoms worsen tonight. He is received some Lasix here and has since improved slightly offered him admission for further evaluation and dialysis she declined will discharge home    EKG shows atrial flutter at a rate of 70 with a normal axis no obvious ischemia. Old EKGs reviewed without atrial flutter he is rate controlled and is on anticoagulation already believe he is low risk for thrombotic event and likely this is from his fluid overload and dialysis should reverse this patient again does not desire admission or further evaluation in the ER    Critical Care Time:  The services I provided to this patient were to treat and/or prevent clinically significant deterioration that could result in the failure of one or more body systems and/or organ systems due to renal failure, arrythmia.     Services included the following:  -reviewing nursing notes and old charts  -vital sign assessments  -direct patient care  -medication orders and management  -interpreting and reviewing diagnostic studies/labs  -re-evaluations  -documentation time    Aggregate critical care time was 38 minutes, which includes only time during which I was engaged in work directly related to the patient's care as described above, whether I was at bedside or elsewhere in the Emergency Department. It did not include time spent performing other reported procedures or the services of residents, students, nurses, or advance practice providers.        Cuong Christian MD    7:48 PM           Procedures

## 2020-09-14 NOTE — ED NOTES
5:07 PM  Did not assess patient, brought immediately back to room 12. Placed initial orders based on chief complaint.

## 2020-09-15 ENCOUNTER — PATIENT OUTREACH (OUTPATIENT)
Dept: CASE MANAGEMENT | Age: 80
End: 2020-09-15

## 2020-09-15 LAB
ATRIAL RATE: 258 BPM
CALCULATED P AXIS, ECG09: -130 DEGREES
CALCULATED R AXIS, ECG10: 9 DEGREES
CALCULATED T AXIS, ECG11: 9 DEGREES
DIAGNOSIS, 93000: NORMAL
Q-T INTERVAL, ECG07: 416 MS
QRS DURATION, ECG06: 74 MS
QTC CALCULATION (BEZET), ECG08: 449 MS
VENTRICULAR RATE, ECG03: 70 BPM

## 2020-09-15 NOTE — PROGRESS NOTES
Patient contacted regarding [id number] suspect. Discussed COVID-19 related testing which was pending at this time. Test results were pending. Patient informed of results, if available? pending     Care Transition Nurse/ Ambulatory Care Manager/ LPN Care Coordinator contacted the patient by telephone to perform post discharge assessment. Verified name and  with patient as identifiers. Provided introduction to self, and explanation of the CTN/ACM/LPN role, and reason for call due to risk factors for infection and/or exposure to COVID-19. Symptoms reviewed with patient who verbalized the following symptoms: cough, shortness of breath, no new symptoms and no worsening symptoms. Due to no new or worsening symptoms encounter was not routed to provider for escalation. Discussed follow-up appointments. If no appointment was previously scheduled, appointment scheduling offered: no patient will call to schedule follow up with pcp  [facility] follow up appointment(s):   Future Appointments   Date Time Provider Amos Davila   2021 10:00 AM Kavitha Harris MD [address] [facility] [id number]     Non-[facility] follow up appointment(s): n/a      Advance Care Planning:   Does patient have an Advance Directive: healthcare decision makers on file    Patient has following risk factors of: immunocompromised, chronic kidney disease and htn. CTN/ACM/LPN reviewed discharge instructions, medical action plan and red flags such as increased shortness of breath, increasing fever and signs of decompensation with patient who verbalized understanding. Discussed exposure protocols and quarantine with CDC Guidelines What to do if you are sick with coronavirus disease 2019.  Patient was given an opportunity for questions and concerns. The patient agrees to contact the local [facility] department [address]  ([phone]) and PCP office for questions related to their healthcare.  CTN/ACM/LPN provided contact information for future needs.    Reviewed and educated patient on any new and changed medications related to discharge diagnosis. Patient/family/caregiver given information for [employer] and agrees to enroll no  Patient's preferred e-mail:  n/a  Patient's preferred phone number: n/a  Based on Loop alert triggers, patient will be contacted by nurse care manager for worsening symptoms. Plan for follow-up call in 1-2 days based on severity of symptoms and risk factors.

## 2020-09-15 NOTE — ED NOTES
I have reviewed discharge instructions with the patient. The patient verbalized understanding. PIV in R hand and R forearm removed by this RN. No sx of distress, ambulatory to ED lobby.

## 2020-09-16 ENCOUNTER — PATIENT OUTREACH (OUTPATIENT)
Dept: CASE MANAGEMENT | Age: 80
End: 2020-09-16

## 2020-09-16 LAB — SARS-COV-2, COV2NT: NOT DETECTED

## 2020-09-16 NOTE — PROGRESS NOTES
Patient contacted regarding COVID-19 risk and screening. Discussed COVID-19 related testing which was available at this time. Test results were negative. Patient informed of results, if available? yes     Care Transition Nurse/ Ambulatory Care Manager/ LPN Care Coordinator contacted the patient by telephone to perform follow-up assessment. Verified name and  with patient as identifiers. Patient has following risk factors of: immunocompromised, chronic kidney disease and htn. Symptoms reviewed with patient who verbalized the following symptoms: no new symptoms and no worsening symptoms. Due to no new or worsening symptoms encounter was not routed to provider for escalation. Education provided regarding infection prevention, and signs and symptoms of COVID-19 and when to seek medical attention with patient who verbalized understanding. Discussed exposure protocols and quarantine from [address] you at higher risk for severe illness  and given an opportunity for questions and concerns. The patient agrees to contact PCP office for questions related to their healthcare. CTN/ACM/LPN provided contact information for future reference. From CDC: Are you at higher risk for severe illness?  Wash your hands often.  Avoid close contact (6 feet, which is about two arm lengths) with people who are sick.  Put distance between yourself and other people if COVID-19 is spreading in your community.  Clean and disinfect frequently touched surfaces.  Avoid all cruise travel and non-essential air travel.  Call your healthcare professional if you have concerns about COVID-19 and your underlying condition or if you are sick. For more information on steps you can take to protect yourself, see CDC's How to [place] for follow-up call in 7-14 days based on severity of symptoms and risk factors.

## 2020-09-20 LAB
BACTERIA SPEC CULT: NORMAL
BACTERIA SPEC CULT: NORMAL
SERVICE CMNT-IMP: NORMAL
SERVICE CMNT-IMP: NORMAL

## 2020-09-22 DIAGNOSIS — F33.41 RECURRENT MAJOR DEPRESSIVE DISORDER, IN PARTIAL REMISSION (HCC): ICD-10-CM

## 2020-09-24 RX ORDER — SERTRALINE HYDROCHLORIDE 100 MG/1
TABLET, FILM COATED ORAL
Qty: 180 TAB | Refills: 1 | Status: SHIPPED | OUTPATIENT
Start: 2020-09-24

## 2020-09-29 ENCOUNTER — PATIENT OUTREACH (OUTPATIENT)
Dept: CASE MANAGEMENT | Age: 80
End: 2020-09-29

## 2020-09-29 NOTE — PROGRESS NOTES
Date/Time:  9/29/2020 11:07 AM  Attempted to reach patient by telephone. Left HIPPA compliant message requesting a return call. This episode is resolved.

## 2020-11-30 RX ORDER — ATORVASTATIN CALCIUM 10 MG/1
TABLET, FILM COATED ORAL
Qty: 90 TAB | Refills: 1 | Status: SHIPPED | OUTPATIENT
Start: 2020-11-30

## 2021-02-02 NOTE — ED NOTES
How Severe Is Your Rash?: moderate TRANSFER - ED to INPATIENT REPORT:    SBAR report made available to receiving floor on this patient being transferred to [address] [place] [address] ([facility])  for routine progression of care       Admitting diagnosis Uncontrolled hypertension [I10]    Information from the following report(s) SBAR was made available to receiving floor. Lines:   Peripheral IV 09/28/19 Right Antecubital (Active)   Site Assessment Clean, dry, & intact 9/28/2019  1:10 PM   Phlebitis Assessment 0 9/28/2019  1:10 PM   Infiltration Assessment 0 9/28/2019  1:10 PM   Dressing Status Clean, dry, & intact 9/28/2019  1:10 PM   Hub Color/Line Status Green 9/28/2019  1:10 PM        Medication list updated today    Opportunity for questions and clarification was provided.       Patient is oriented to time, place, person and situation   Patient is  continent and ambulatory without assist     Valuables transported with patient     Patient transported with:   Monitor  Tech    MAP (Monitor): 111 =Monitored (most recent)  Vitals w/ MEWS Score (last day)     Date/Time MEWS Score Pulse Resp Temp BP Level of Consciousness SpO2    09/28/19 2245  3  79  20  98.3 °F (36.8 °C)  (!) 213/75  Alert  94 %    09/28/19 2143    72  16    (!) 209/72    92 %    09/28/19 2047  3  75  18  99.6 °F (37.6 °C)  (!) 221/95  Alert      09/28/19 2030    83  20    (!) 199/96        09/28/19 2000    73  18    (!) 199/100        09/28/19 1945    83  18    (!) 198/101        09/28/19 1930    75  18    (!) 208/112        09/28/19 1915    83  18    (!) 209/112        09/28/19 1900    77  20    (!) 205/85        09/28/19 1845    83  20    (!) 220/111        09/28/19 1830    77  20    (!) 218/106        09/28/19 1815    80      (!) 195/99        09/28/19 1800    78  22    (!) 215/112        09/28/19 1755    78  22  98.2 °F (36.8 °C)  (!) 218/105        09/28/19 1730  2  79  24  98.2 °F (36.8 °C)  191/81  Alert  92 %    09/28/19 1715    81      200/77   (!) 84 %    09/28/19 1700    76  29    (!) 203/86    (!) 85 %    09/28/19 1645    77  23    (!) 203/84    (!) 87 %    09/28/19 1630          (!) 205/88        09/28/19 1600    77  27    184/78    95 %    09/28/19 1545    78  25    185/78    94 %    09/28/19 1530    77  25    185/77    93 %    09/28/19 1515    78  28    186/78    91 %    09/28/19 1500    81  21    186/79    (!) 89 %    09/28/19 1445    78  22    184/78    (!) 89 %    09/28/19 1430    79  29    194/71    (!) 85 %    09/28/19 1415    79  26    177/75    92 %    09/28/19 1400    84  16    186/72    100 %    09/28/19 1345    70  21    186/73    100 %    09/28/19 1330    67  22    (!) 211/91    90 %    09/28/19 1323    72  22    197/82    90 %    09/28/19 1222  3  81  16  98.6 °F (37 °C)  (!) 217/102  Alert  90 % Is This A New Presentation, Or A Follow-Up?: Rash